# Patient Record
Sex: MALE | Race: WHITE | NOT HISPANIC OR LATINO | Employment: FULL TIME | URBAN - METROPOLITAN AREA
[De-identification: names, ages, dates, MRNs, and addresses within clinical notes are randomized per-mention and may not be internally consistent; named-entity substitution may affect disease eponyms.]

---

## 2017-03-14 ENCOUNTER — TRANSCRIBE ORDERS (OUTPATIENT)
Dept: ADMINISTRATIVE | Facility: HOSPITAL | Age: 62
End: 2017-03-14

## 2017-03-14 DIAGNOSIS — N28.9 UNSPECIFIED DISORDER OF KIDNEY AND URETER: Primary | ICD-10-CM

## 2017-03-23 ENCOUNTER — HOSPITAL ENCOUNTER (OUTPATIENT)
Dept: ULTRASOUND IMAGING | Facility: HOSPITAL | Age: 62
Discharge: HOME | End: 2017-03-23
Admitting: INTERNAL MEDICINE

## 2017-03-23 DIAGNOSIS — N28.9 UNSPECIFIED DISORDER OF KIDNEY AND URETER: ICD-10-CM

## 2017-03-23 PROCEDURE — 76775 US EXAM ABDO BACK WALL LIM: CPT

## 2017-06-15 ENCOUNTER — TRANSCRIBE ORDERS (OUTPATIENT)
Dept: ADMINISTRATIVE | Facility: HOSPITAL | Age: 62
End: 2017-06-15

## 2017-06-15 ENCOUNTER — LAB (OUTPATIENT)
Dept: LAB | Facility: HOSPITAL | Age: 62
End: 2017-06-15

## 2017-06-15 DIAGNOSIS — C73 MALIGNANT NEOPLASM OF THYROID GLAND (HCC): ICD-10-CM

## 2017-06-15 DIAGNOSIS — E78.49 FAMILIAL COMBINED HYPERLIPIDEMIA: ICD-10-CM

## 2017-06-15 DIAGNOSIS — I10 ESSENTIAL HYPERTENSION, MALIGNANT: ICD-10-CM

## 2017-06-15 DIAGNOSIS — E11.311 MACULAR EDEMA, DIABETIC (HCC): ICD-10-CM

## 2017-06-15 DIAGNOSIS — E66.9 OBESITY, UNSPECIFIED OBESITY SEVERITY, UNSPECIFIED OBESITY TYPE: ICD-10-CM

## 2017-06-15 DIAGNOSIS — E11.311 MACULAR EDEMA, DIABETIC (HCC): Primary | ICD-10-CM

## 2017-06-15 LAB
ALBUMIN SERPL-MCNC: 4.8 G/DL (ref 3.5–5.2)
ALBUMIN/GLOB SERPL: 1.5 G/DL
ALP SERPL-CCNC: 54 U/L (ref 39–117)
ALT SERPL W P-5'-P-CCNC: 22 U/L (ref 1–41)
ANION GAP SERPL CALCULATED.3IONS-SCNC: 14.6 MMOL/L
AST SERPL-CCNC: 21 U/L (ref 1–40)
BILIRUB SERPL-MCNC: 0.6 MG/DL (ref 0.1–1.2)
BUN BLD-MCNC: 19 MG/DL (ref 8–23)
BUN/CREAT SERPL: 18.1 (ref 7–25)
CALCIUM SPEC-SCNC: 9.7 MG/DL (ref 8.6–10.5)
CHLORIDE SERPL-SCNC: 96 MMOL/L (ref 98–107)
CHOLEST SERPL-MCNC: 168 MG/DL (ref 0–200)
CO2 SERPL-SCNC: 23.4 MMOL/L (ref 22–29)
CREAT BLD-MCNC: 1.05 MG/DL (ref 0.76–1.27)
GFR SERPL CREATININE-BSD FRML MDRD: 72 ML/MIN/1.73
GLOBULIN UR ELPH-MCNC: 3.2 GM/DL
GLUCOSE BLD-MCNC: 143 MG/DL (ref 65–99)
HBA1C MFR BLD: 6.37 % (ref 4.8–5.6)
HDLC SERPL-MCNC: 60 MG/DL (ref 40–60)
LDLC SERPL CALC-MCNC: 73 MG/DL (ref 0–100)
LDLC/HDLC SERPL: 1.21 {RATIO}
POTASSIUM BLD-SCNC: 4.1 MMOL/L (ref 3.5–5.2)
PROT SERPL-MCNC: 8 G/DL (ref 6–8.5)
SODIUM BLD-SCNC: 134 MMOL/L (ref 136–145)
TRIGL SERPL-MCNC: 176 MG/DL (ref 0–150)
VIT B12 BLD-MCNC: 418 PG/ML (ref 211–946)
VLDLC SERPL-MCNC: 35.2 MG/DL (ref 5–40)

## 2017-06-15 PROCEDURE — 36415 COLL VENOUS BLD VENIPUNCTURE: CPT

## 2017-06-15 PROCEDURE — 82607 VITAMIN B-12: CPT

## 2017-06-15 PROCEDURE — 80061 LIPID PANEL: CPT

## 2017-06-15 PROCEDURE — 80053 COMPREHEN METABOLIC PANEL: CPT

## 2017-06-15 PROCEDURE — 83036 HEMOGLOBIN GLYCOSYLATED A1C: CPT

## 2017-10-03 ENCOUNTER — LAB (OUTPATIENT)
Dept: LAB | Facility: HOSPITAL | Age: 62
End: 2017-10-03

## 2017-10-03 ENCOUNTER — TRANSCRIBE ORDERS (OUTPATIENT)
Dept: ADMINISTRATIVE | Facility: HOSPITAL | Age: 62
End: 2017-10-03

## 2017-10-03 DIAGNOSIS — E78.5 HYPERLIPIDEMIA, UNSPECIFIED HYPERLIPIDEMIA TYPE: ICD-10-CM

## 2017-10-03 DIAGNOSIS — E03.8 HYPOTHYROIDISM DUE TO FIBROUS INVASIVE THYROIDITIS: ICD-10-CM

## 2017-10-03 DIAGNOSIS — Z85.850 PERSONAL HISTORY OF MALIGNANT NEOPLASM OF THYROID: ICD-10-CM

## 2017-10-03 DIAGNOSIS — E11.9 DIABETES MELLITUS WITHOUT COMPLICATION (HCC): Primary | ICD-10-CM

## 2017-10-03 DIAGNOSIS — E06.5 HYPOTHYROIDISM DUE TO FIBROUS INVASIVE THYROIDITIS: ICD-10-CM

## 2017-10-03 DIAGNOSIS — E11.9 DIABETES MELLITUS WITHOUT COMPLICATION (HCC): ICD-10-CM

## 2017-10-03 LAB
ALBUMIN SERPL-MCNC: 4.7 G/DL (ref 3.5–5.2)
ALBUMIN/GLOB SERPL: 1.6 G/DL
ALP SERPL-CCNC: 64 U/L (ref 39–117)
ALT SERPL W P-5'-P-CCNC: 26 U/L (ref 1–41)
ANION GAP SERPL CALCULATED.3IONS-SCNC: 13.7 MMOL/L
AST SERPL-CCNC: 25 U/L (ref 1–40)
BILIRUB SERPL-MCNC: 0.6 MG/DL (ref 0.1–1.2)
BUN BLD-MCNC: 17 MG/DL (ref 8–23)
BUN/CREAT SERPL: 15.5 (ref 7–25)
CALCIUM SPEC-SCNC: 10 MG/DL (ref 8.6–10.5)
CHLORIDE SERPL-SCNC: 100 MMOL/L (ref 98–107)
CHOLEST SERPL-MCNC: 173 MG/DL (ref 0–200)
CO2 SERPL-SCNC: 26.3 MMOL/L (ref 22–29)
CREAT BLD-MCNC: 1.1 MG/DL (ref 0.76–1.27)
GFR SERPL CREATININE-BSD FRML MDRD: 68 ML/MIN/1.73
GLOBULIN UR ELPH-MCNC: 2.9 GM/DL
GLUCOSE BLD-MCNC: 120 MG/DL (ref 65–99)
HBA1C MFR BLD: 6.6 % (ref 4.8–5.6)
HDLC SERPL-MCNC: 66 MG/DL (ref 40–60)
LDLC SERPL CALC-MCNC: 63 MG/DL (ref 0–100)
LDLC/HDLC SERPL: 0.95 {RATIO}
POTASSIUM BLD-SCNC: 4.2 MMOL/L (ref 3.5–5.2)
PROT SERPL-MCNC: 7.6 G/DL (ref 6–8.5)
SODIUM BLD-SCNC: 140 MMOL/L (ref 136–145)
TRIGL SERPL-MCNC: 220 MG/DL (ref 0–150)
TSH SERPL DL<=0.05 MIU/L-ACNC: 1.07 MIU/ML (ref 0.27–4.2)
VLDLC SERPL-MCNC: 44 MG/DL (ref 5–40)

## 2017-10-03 PROCEDURE — 83036 HEMOGLOBIN GLYCOSYLATED A1C: CPT

## 2017-10-03 PROCEDURE — 84432 ASSAY OF THYROGLOBULIN: CPT

## 2017-10-03 PROCEDURE — 80053 COMPREHEN METABOLIC PANEL: CPT

## 2017-10-03 PROCEDURE — 84443 ASSAY THYROID STIM HORMONE: CPT

## 2017-10-03 PROCEDURE — 80061 LIPID PANEL: CPT

## 2017-10-03 PROCEDURE — 36415 COLL VENOUS BLD VENIPUNCTURE: CPT

## 2017-10-03 PROCEDURE — 86800 THYROGLOBULIN ANTIBODY: CPT

## 2017-10-04 LAB — THYROGLOB AB SERPL-ACNC: <1 IU/ML (ref 0–0.9)

## 2017-10-08 LAB — THYROGLOBULIN (TG-RIA): <2 NG/ML

## 2018-10-12 ENCOUNTER — OFFICE VISIT (OUTPATIENT)
Dept: ORTHOPEDIC SURGERY | Facility: CLINIC | Age: 63
End: 2018-10-12

## 2018-10-12 VITALS — WEIGHT: 213.2 LBS | TEMPERATURE: 98.3 F | HEIGHT: 71 IN | BODY MASS INDEX: 29.85 KG/M2

## 2018-10-12 DIAGNOSIS — M48.062 SPINAL STENOSIS OF LUMBAR REGION WITH NEUROGENIC CLAUDICATION: ICD-10-CM

## 2018-10-12 DIAGNOSIS — M54.50 SPINE PAIN, LUMBAR: Primary | ICD-10-CM

## 2018-10-12 PROCEDURE — 72100 X-RAY EXAM L-S SPINE 2/3 VWS: CPT | Performed by: ORTHOPAEDIC SURGERY

## 2018-10-12 PROCEDURE — 99214 OFFICE O/P EST MOD 30 MIN: CPT | Performed by: ORTHOPAEDIC SURGERY

## 2018-10-12 RX ORDER — AMLODIPINE BESYLATE AND ATORVASTATIN CALCIUM 5; 20 MG/1; MG/1
1 TABLET, FILM COATED ORAL DAILY
COMMUNITY
Start: 2015-12-02 | End: 2021-09-21

## 2018-10-12 RX ORDER — NABUMETONE 750 MG/1
TABLET, FILM COATED ORAL
Qty: 60 TABLET | Refills: 0 | Status: SHIPPED | OUTPATIENT
Start: 2018-10-12 | End: 2018-11-21 | Stop reason: SDUPTHER

## 2018-10-12 RX ORDER — LEVOTHYROXINE SODIUM 0.2 MG/1
TABLET ORAL
COMMUNITY
Start: 2015-09-30 | End: 2018-12-11 | Stop reason: ALTCHOICE

## 2018-10-12 RX ORDER — FENOFIBRATE 48 MG/1
48 TABLET, COATED ORAL DAILY
COMMUNITY
Start: 2015-12-02

## 2018-10-12 NOTE — PROGRESS NOTES
New patient or new problem visit    Chief Complaint   Patient presents with   • Lumbar Spine - Establish Care, Pain       HPI: He complains of low back pain chronic but worse in the last 3 months severe stabbing aching radiating sometimes to the legs and thighs predominantly the latter occasional numbness in the left great toe.  He has had no specific treatment.  The severe pain began following a 54 hole golf tournament in which he participated.  Pertinent past history is notable for 2 laminectomies and then a subsequent anterior interbody fusion at L4 5 and L5-S1 many years ago by Dr. Dawit appiah and refugio.  I performed an anterior cervical discectomy and fusion on him in 2015.    PFSH: See chart- reviewed    Review of Systems   Musculoskeletal: Positive for back pain. Negative for arthralgias, joint swelling, myalgias, neck pain and neck stiffness.   Neurological: Positive for weakness (Left arm, Left leg) and numbness (Left leg). Negative for dizziness, tremors, seizures, syncope, facial asymmetry, speech difficulty, light-headedness and headaches.   Hematological: Negative for adenopathy. Does not bruise/bleed easily.   Psychiatric/Behavioral: Negative for agitation, behavioral problems, confusion, dysphoric mood, hallucinations, self-injury, sleep disturbance and suicidal ideas. The patient is not nervous/anxious and is not hyperactive.        PE: Constitutional: Vital signs above-noted.  Awake, alert and oriented    Psychiatric: Affect and insight do not appear grossly disturbed.    Pulmonary: Breathing is unlabored, color is good.    Skin: Warm, dry and normal turgor    Cardiac:  pedal pulses intact.  No edema.    Eyesight and hearing appear adequate for examination purposes      Musculoskeletal:  There is moderate tenderness to percussion and palpation of the spine. Motion appears somewhat.  Posture is unremarkable to coronal and sagittal inspection.    The skin about the area is able for a 4 cm midline  lumbosacral and intercristal laminectomy incision.  There is no palpable or visible deformity.  There is no local spasm.       Neurologic:   Reflexes are 2+ and symmetrical in the patellae and absent in the Achilles.   Motor function is undisturbed in quadriceps, EHL, and gastrocnemius the left but he has slight weakness of the right EHL   Sensation appears symmetrically intact to light touch   .  In the bilateral lower extremities there is no evidence of atrophy.   Clonus is absent..  Gait appears undisturbed.  SLR test negative      MEDICAL DECISION MAKING    XRAY: Plain film x-rays of lumbar spine show large defect out of the right iliac area where the bone graft was harvested, and a solid anterior interbody fusion in good position at L4 4-5 and L5-S1.  Marketed disc degeneration is noted at L 3-L4.  No comparison views are available.    Other: n/a    Impression: Lumbar disc degeneration lumbar spinal stenosis    Plan: I recommended MRI scan for further evaluation am going to start him on Relafen.  I suspect we'll see significant stenosis for which epidural injections will be the starting point but the therapy could be an option as well.

## 2018-10-29 RX ORDER — OXYCODONE AND ACETAMINOPHEN 7.5; 325 MG/1; MG/1
1 TABLET ORAL EVERY 8 HOURS PRN
Qty: 30 TABLET | Refills: 0 | Status: SHIPPED | OUTPATIENT
Start: 2018-10-29 | End: 2018-11-05 | Stop reason: SDUPTHER

## 2018-11-01 ENCOUNTER — TELEPHONE (OUTPATIENT)
Dept: ORTHOPEDIC SURGERY | Facility: CLINIC | Age: 63
End: 2018-11-01

## 2018-11-01 DIAGNOSIS — M54.50 LUMBAR PAIN: ICD-10-CM

## 2018-11-01 DIAGNOSIS — M48.061 SPINAL STENOSIS OF LUMBAR REGION, UNSPECIFIED WHETHER NEUROGENIC CLAUDICATION PRESENT: Primary | ICD-10-CM

## 2018-11-01 DIAGNOSIS — M54.50 LUMBAR SPINE PAIN: Primary | ICD-10-CM

## 2018-11-01 NOTE — TELEPHONE ENCOUNTER
I reviewed the MRI and discussed results with him.  He has stenosis above the old fusion.  I have recommended lumbar epidurals to be performed ASAP and physical therapy.  Please set these up.

## 2018-11-05 ENCOUNTER — HOSPITAL ENCOUNTER (OUTPATIENT)
Dept: PAIN MEDICINE | Facility: HOSPITAL | Age: 63
Discharge: HOME | End: 2018-11-05
Attending: ORTHOPAEDIC SURGERY | Admitting: ANESTHESIOLOGY

## 2018-11-05 ENCOUNTER — ANESTHESIA (OUTPATIENT)
Dept: PAIN MEDICINE | Facility: HOSPITAL | Age: 63
End: 2018-11-05

## 2018-11-05 ENCOUNTER — HOSPITAL ENCOUNTER (OUTPATIENT)
Dept: GENERAL RADIOLOGY | Facility: HOSPITAL | Age: 63
Discharge: HOME | End: 2018-11-05

## 2018-11-05 ENCOUNTER — ANESTHESIA EVENT (OUTPATIENT)
Dept: PAIN MEDICINE | Facility: HOSPITAL | Age: 63
End: 2018-11-05

## 2018-11-05 VITALS
TEMPERATURE: 98 F | SYSTOLIC BLOOD PRESSURE: 154 MMHG | OXYGEN SATURATION: 93 % | WEIGHT: 211 LBS | RESPIRATION RATE: 16 BRPM | HEIGHT: 71 IN | DIASTOLIC BLOOD PRESSURE: 102 MMHG | BODY MASS INDEX: 29.54 KG/M2 | HEART RATE: 59 BPM

## 2018-11-05 DIAGNOSIS — R52 PAIN: ICD-10-CM

## 2018-11-05 DIAGNOSIS — M48.061 SPINAL STENOSIS OF LUMBAR REGION WITHOUT NEUROGENIC CLAUDICATION: Primary | ICD-10-CM

## 2018-11-05 LAB — GLUCOSE BLDC GLUCOMTR-MCNC: 134 MG/DL (ref 70–130)

## 2018-11-05 PROCEDURE — 82962 GLUCOSE BLOOD TEST: CPT

## 2018-11-05 PROCEDURE — 77003 FLUOROGUIDE FOR SPINE INJECT: CPT

## 2018-11-05 PROCEDURE — 0 IOPAMIDOL 41 % SOLUTION: Performed by: ANESTHESIOLOGY

## 2018-11-05 PROCEDURE — 25010000002 METHYLPREDNISOLONE PER 80 MG: Performed by: ANESTHESIOLOGY

## 2018-11-05 PROCEDURE — C1755 CATHETER, INTRASPINAL: HCPCS

## 2018-11-05 RX ORDER — METHYLPREDNISOLONE ACETATE 80 MG/ML
80 INJECTION, SUSPENSION INTRA-ARTICULAR; INTRALESIONAL; INTRAMUSCULAR; SOFT TISSUE ONCE
Status: COMPLETED | OUTPATIENT
Start: 2018-11-05 | End: 2018-11-05

## 2018-11-05 RX ORDER — OXYCODONE AND ACETAMINOPHEN 7.5; 325 MG/1; MG/1
1 TABLET ORAL EVERY 8 HOURS PRN
Qty: 30 TABLET | Refills: 0 | Status: SHIPPED | OUTPATIENT
Start: 2018-11-05 | End: 2019-08-28

## 2018-11-05 RX ORDER — LIDOCAINE HYDROCHLORIDE 10 MG/ML
1 INJECTION, SOLUTION INFILTRATION; PERINEURAL ONCE
Status: DISCONTINUED | OUTPATIENT
Start: 2018-11-05 | End: 2018-11-06 | Stop reason: HOSPADM

## 2018-11-05 RX ORDER — MIDAZOLAM HYDROCHLORIDE 1 MG/ML
1 INJECTION INTRAMUSCULAR; INTRAVENOUS
Status: DISCONTINUED | OUTPATIENT
Start: 2018-11-05 | End: 2018-11-06 | Stop reason: HOSPADM

## 2018-11-05 RX ORDER — FENTANYL CITRATE 50 UG/ML
50 INJECTION, SOLUTION INTRAMUSCULAR; INTRAVENOUS AS NEEDED
Status: DISCONTINUED | OUTPATIENT
Start: 2018-11-05 | End: 2018-11-06 | Stop reason: HOSPADM

## 2018-11-05 RX ADMIN — METHYLPREDNISOLONE ACETATE 80 MG: 80 INJECTION, SUSPENSION INTRA-ARTICULAR; INTRALESIONAL; INTRAMUSCULAR; SOFT TISSUE at 14:20

## 2018-11-05 RX ADMIN — IOPAMIDOL 10 ML: 408 INJECTION, SOLUTION INTRATHECAL at 14:20

## 2018-11-05 NOTE — TELEPHONE ENCOUNTER
PT CALLED SAID HE IS GOING OUT OF TOWN AND CONCERNED HE WILL RUN OUT OF HIS MEDICINE AND WANTS TO TALK WITH YOU ABOUT IT/DM

## 2018-11-05 NOTE — H&P
"[facility]    History and Physical    Patient Name: EVARISTO Garcia  :  1955  MRN:  [id number]  Date of Admission: 2018    Subjective     Patient is a 63 y.o. male presents with chief complaint of acute, intermitent, moderate low back, hips: bilateral and leg: bilateral pain.  Onset of symptoms was gradual starting several years ago.  Symptoms are associated/aggravated by activity or twisting. Symptoms improve with rest    The following portions of the patients history were reviewed and updated as appropriate: current medications, allergies, past medical history, past surgical history, past family history, past social history and problem list                Objective     Past Medical History: No past medical history on file.  Past Surgical History:   Past Surgical History:   Procedure Laterality Date   • BACK SURGERY      Double anterior L3, L4, L5   • BACK SURGERY     • LAMINECTOMY  ,      L4-L5, L3-L4   • THYROIDECTOMY  2015     Family History: No family history on file.  Social History:   Social History   Substance Use Topics   • Smoking status: Not on file   • Smokeless tobacco: Not on file   • Alcohol use Not on file       Vital Signs Range for the last 24 hours  Temperature:     Temp Source:     BP:     Pulse:     Respirations:     SPO2:     O2 Amount (l/min):     O2 Devices     Weight: Weight:  [95.7 kg (211 lb)] 95.7 kg (211 lb)     Flowsheet Rows      First Filed Value   Admission Height  180.3 cm (71\") Documented at 2018 1351   Admission Weight  95.7 kg (211 lb) Documented at 2018 1351          --------------------------------------------------------------------------------    Current Outpatient Prescriptions   Medication Sig Dispense Refill   • amLODIPine-atorvastatin (CADUET) 5-20 MG per tablet Take  by mouth.     • fenofibrate (TRICOR) 48 MG tablet Take  by mouth.     • levothyroxine (SYNTHROID, LEVOTHROID) 200 MCG tablet Take  by mouth.     • metFORMIN " (GLUCOPHAGE) 1000 MG tablet Take 1,000 mg by mouth Daily.     • nabumetone (RELAFEN) 750 MG tablet Take 2 tablets once daily 60 tablet 0   • oxyCODONE-acetaminophen (PERCOCET) 7.5-325 MG per tablet Take 1 tablet by mouth Every 8 (Eight) Hours As Needed for Moderate Pain . 30 tablet 0     No current facility-administered medications for this encounter.        --------------------------------------------------------------------------------  Assessment/Plan      Anesthesia Evaluation     Patient summary reviewed and Nursing notes reviewed   NPO Solid Status: > 8 hours  NPO Liquid Status: > 2 hours    Pain impairs ability to perform ADLs: Sleeping  Modalities previously tried to control pain with limited effectiveness within the last 4-6 weeks: Rest     Airway   Mallampati: II  TM distance: >3 FB  Neck ROM: full  Dental - normal exam     Pulmonary - negative pulmonary ROS and normal exam   Cardiovascular - negative cardio ROS and normal exam        Neuro/Psych- negative ROS and neuro exam normal  GI/Hepatic/Renal/Endo    (+)   diabetes mellitus type 2,     Musculoskeletal (-) normal exam    (+) back pain, radiculopathy  Abdominal  - normal exam   Substance History - negative use     OB/GYN negative ob/gyn ROS         Other   (+) arthritis                Diagnosis and Plan    Treatment Plan  ASA 3      Procedures: Lumbar Epidural Steroid Injection(LESI), With fluoroscopy,       Anesthetic plan and risks discussed with patient.          Diagnosis     * Lumbar radiculopathy [M54.16]     * Lumbar spinal stenosis [M48.061]     * Lumbar degenerative disc disease [M51.36]     * Displacement of lumbar disc with radiculopathy [M51.16]

## 2018-11-05 NOTE — H&P (VIEW-ONLY)
"[facility]    History and Physical    Patient Name: EVARISTO Garcia  :  1955  MRN:  [id number]  Date of Admission: 2018    Subjective     Patient is a 63 y.o. male presents with chief complaint of acute, intermitent, moderate low back, hips: bilateral and leg: bilateral pain.  Onset of symptoms was gradual starting several years ago.  Symptoms are associated/aggravated by activity or twisting. Symptoms improve with rest    The following portions of the patients history were reviewed and updated as appropriate: current medications, allergies, past medical history, past surgical history, past family history, past social history and problem list                Objective     Past Medical History: No past medical history on file.  Past Surgical History:   Past Surgical History:   Procedure Laterality Date   • BACK SURGERY      Double anterior L3, L4, L5   • BACK SURGERY     • LAMINECTOMY  ,      L4-L5, L3-L4   • THYROIDECTOMY  2015     Family History: No family history on file.  Social History:   Social History   Substance Use Topics   • Smoking status: Not on file   • Smokeless tobacco: Not on file   • Alcohol use Not on file       Vital Signs Range for the last 24 hours  Temperature:     Temp Source:     BP:     Pulse:     Respirations:     SPO2:     O2 Amount (l/min):     O2 Devices     Weight: Weight:  [95.7 kg (211 lb)] 95.7 kg (211 lb)     Flowsheet Rows      First Filed Value   Admission Height  180.3 cm (71\") Documented at 2018 1351   Admission Weight  95.7 kg (211 lb) Documented at 2018 1351          --------------------------------------------------------------------------------    Current Outpatient Prescriptions   Medication Sig Dispense Refill   • amLODIPine-atorvastatin (CADUET) 5-20 MG per tablet Take  by mouth.     • fenofibrate (TRICOR) 48 MG tablet Take  by mouth.     • levothyroxine (SYNTHROID, LEVOTHROID) 200 MCG tablet Take  by mouth.     • metFORMIN " (GLUCOPHAGE) 1000 MG tablet Take 1,000 mg by mouth Daily.     • nabumetone (RELAFEN) 750 MG tablet Take 2 tablets once daily 60 tablet 0   • oxyCODONE-acetaminophen (PERCOCET) 7.5-325 MG per tablet Take 1 tablet by mouth Every 8 (Eight) Hours As Needed for Moderate Pain . 30 tablet 0     No current facility-administered medications for this encounter.        --------------------------------------------------------------------------------  Assessment/Plan      Anesthesia Evaluation     Patient summary reviewed and Nursing notes reviewed   NPO Solid Status: > 8 hours  NPO Liquid Status: > 2 hours    Pain impairs ability to perform ADLs: Sleeping  Modalities previously tried to control pain with limited effectiveness within the last 4-6 weeks: Rest     Airway   Mallampati: II  TM distance: >3 FB  Neck ROM: full  Dental - normal exam     Pulmonary - negative pulmonary ROS and normal exam   Cardiovascular - negative cardio ROS and normal exam        Neuro/Psych- negative ROS and neuro exam normal  GI/Hepatic/Renal/Endo    (+)   diabetes mellitus type 2,     Musculoskeletal (-) normal exam    (+) back pain, radiculopathy  Abdominal  - normal exam   Substance History - negative use     OB/GYN negative ob/gyn ROS         Other   (+) arthritis                Diagnosis and Plan    Treatment Plan  ASA 3      Procedures: Lumbar Epidural Steroid Injection(LESI), With fluoroscopy,       Anesthetic plan and risks discussed with patient.          Diagnosis     * Lumbar radiculopathy [M54.16]     * Lumbar spinal stenosis [M48.061]     * Lumbar degenerative disc disease [M51.36]     * Displacement of lumbar disc with radiculopathy [M51.16]

## 2018-11-05 NOTE — ANESTHESIA PROCEDURE NOTES
PAIN Epidural block      Patient reassessed immediately prior to procedure    Patient location during procedure: pain clinic  Indication:procedure for pain  Performed By  Anesthesiologist: CAROLINE EASLEY  Preanesthetic Checklist  Completed: patient identified, site marked, surgical consent, pre-op evaluation, timeout performed, risks and benefits discussed and monitors and equipment checked  Additional Notes  Depomedrol - 80mg    Needle position confirmed by fluoroscopy and epidurogram using 2cc of [id number].    Diagnosis  Post-Op Diagnosis Codes:     * Lumbar radiculopathy (M54.16)     * Lumbar spinal stenosis (M48.061)     * Lumbar degenerative disc disease (M51.36)     * Displacement of lumbar disc with radiculopathy (M51.16)    Prep:  Pt Position:prone  Sterile Tech:cap, gloves, mask and sterile barrier  Prep:chlorhexidine gluconate and isopropyl alcohol  Monitoring:blood pressure monitoring, continuous pulse oximetry and EKG  Procedure:  Sedation: no   Approach:right paramedian  Guidance: fluoroscopy  Location:lumbar  Level:3-4  Needle Type:Tuohy  Needle Gauge:20  Aspiration:negative  Medications:  Depomedrol:80 mg  Preservative Free Saline:2mL  Isovue:2mL    Post Assessment:  Post-procedure: bandaidmichael.  Pt Tolerance:patient tolerated the procedure well with no apparent complications  Complications:no

## 2018-11-21 ENCOUNTER — TELEPHONE (OUTPATIENT)
Dept: PAIN MEDICINE | Facility: HOSPITAL | Age: 63
End: 2018-11-21

## 2018-11-21 RX ORDER — NABUMETONE 750 MG/1
TABLET, FILM COATED ORAL
Qty: 60 TABLET | Refills: 0 | Status: SHIPPED | OUTPATIENT
Start: 2018-11-21 | End: 2018-12-29 | Stop reason: SDUPTHER

## 2018-11-27 ENCOUNTER — HOSPITAL ENCOUNTER (OUTPATIENT)
Dept: PAIN MEDICINE | Facility: HOSPITAL | Age: 63
Discharge: HOME | End: 2018-11-27
Attending: ANESTHESIOLOGY | Admitting: ANESTHESIOLOGY

## 2018-11-27 ENCOUNTER — HOSPITAL ENCOUNTER (OUTPATIENT)
Dept: GENERAL RADIOLOGY | Facility: HOSPITAL | Age: 63
Discharge: HOME | End: 2018-11-27

## 2018-11-27 ENCOUNTER — ANESTHESIA (OUTPATIENT)
Dept: PAIN MEDICINE | Facility: HOSPITAL | Age: 63
End: 2018-11-27

## 2018-11-27 ENCOUNTER — ANESTHESIA EVENT (OUTPATIENT)
Dept: PAIN MEDICINE | Facility: HOSPITAL | Age: 63
End: 2018-11-27

## 2018-11-27 VITALS
HEART RATE: 76 BPM | TEMPERATURE: 97.7 F | RESPIRATION RATE: 16 BRPM | DIASTOLIC BLOOD PRESSURE: 93 MMHG | OXYGEN SATURATION: 97 % | SYSTOLIC BLOOD PRESSURE: 151 MMHG

## 2018-11-27 DIAGNOSIS — R52 PAIN: ICD-10-CM

## 2018-11-27 DIAGNOSIS — M48.061 SPINAL STENOSIS OF LUMBAR REGION WITHOUT NEUROGENIC CLAUDICATION: ICD-10-CM

## 2018-11-27 PROCEDURE — C1755 CATHETER, INTRASPINAL: HCPCS

## 2018-11-27 PROCEDURE — 77003 FLUOROGUIDE FOR SPINE INJECT: CPT

## 2018-11-27 PROCEDURE — 25010000002 METHYLPREDNISOLONE PER 80 MG: Performed by: ANESTHESIOLOGY

## 2018-11-27 RX ORDER — METHYLPREDNISOLONE ACETATE 80 MG/ML
80 INJECTION, SUSPENSION INTRA-ARTICULAR; INTRALESIONAL; INTRAMUSCULAR; SOFT TISSUE ONCE
Status: DISCONTINUED | OUTPATIENT
Start: 2018-11-27 | End: 2018-11-28 | Stop reason: HOSPADM

## 2018-11-27 RX ORDER — SODIUM CHLORIDE 0.9 % (FLUSH) 0.9 %
1-10 SYRINGE (ML) INJECTION AS NEEDED
Status: DISCONTINUED | OUTPATIENT
Start: 2018-11-27 | End: 2018-11-28 | Stop reason: HOSPADM

## 2018-11-27 RX ORDER — FENTANYL CITRATE 50 UG/ML
50 INJECTION, SOLUTION INTRAMUSCULAR; INTRAVENOUS AS NEEDED
Status: DISCONTINUED | OUTPATIENT
Start: 2018-11-27 | End: 2018-11-28 | Stop reason: HOSPADM

## 2018-11-27 RX ORDER — LIDOCAINE HYDROCHLORIDE 10 MG/ML
1 INJECTION, SOLUTION INFILTRATION; PERINEURAL ONCE AS NEEDED
Status: DISCONTINUED | OUTPATIENT
Start: 2018-11-27 | End: 2018-11-28 | Stop reason: HOSPADM

## 2018-11-27 RX ORDER — METHYLPREDNISOLONE ACETATE 80 MG/ML
80 INJECTION, SUSPENSION INTRA-ARTICULAR; INTRALESIONAL; INTRAMUSCULAR; SOFT TISSUE ONCE
Status: COMPLETED | OUTPATIENT
Start: 2018-11-27 | End: 2018-11-27

## 2018-11-27 RX ORDER — MIDAZOLAM HYDROCHLORIDE 1 MG/ML
1 INJECTION INTRAMUSCULAR; INTRAVENOUS AS NEEDED
Status: DISCONTINUED | OUTPATIENT
Start: 2018-11-27 | End: 2018-11-28 | Stop reason: HOSPADM

## 2018-11-27 RX ADMIN — METHYLPREDNISOLONE ACETATE 80 MG: 80 INJECTION, SUSPENSION INTRA-ARTICULAR; INTRALESIONAL; INTRAMUSCULAR; SOFT TISSUE at 13:03

## 2018-11-27 NOTE — ANESTHESIA PROCEDURE NOTES
PAIN Epidural block    Pre-sedation assessment completed: 11/27/2018 12:51 PM    Patient reassessed immediately prior to procedure    Patient location during procedure: pain clinic  Indication:procedure for pain  Performed By  Anesthesiologist: Celeste Braun MD  Preanesthetic Checklist  Completed: patient identified, site marked, surgical consent, pre-op evaluation, timeout performed, IV checked, risks and benefits discussed and monitors and equipment checked  Additional Notes  Lumbar: degen, radiculopathy, displacement, stenosis  Fluoro used.      Pt did not tolerate local skin infiltration well or attempt at placement at L3/4.  Was writhing in pain & using explitive language.  Offered to abort procedure.  D/t pain w/ procedure, relocated & topicalized L5/S1. Again, poor tolerance to skin infiltration.  Easily gained entry into epidural space there on first attempt.  Would consider sedation in the future.      Prep:  Pt Position:prone  Sterile Tech:cap, gloves, mask and sterile barrier  Prep:chlorhexidine gluconate and isopropyl alcohol  Monitoring:blood pressure monitoring, continuous pulse oximetry and EKG  Procedure:  Sedation: no   Approach:right paramedian  Guidance: fluoroscopy  Location:lumbar  Level:5-6  Needle Type:Tuohy  Needle Gauge:20  Aspiration:negative  Medications:  Depomedrol:80  Preservative Free Saline:3mL    Post Assessment:  Dressing:occlusive dressing applied  Pt Tolerance:patient tolerated the procedure well with no apparent complications  Complications:no

## 2018-11-27 NOTE — INTERVAL H&P NOTE
[facility]  H&P reviewed. No changes since last visit.  Patient states   50-75% improvement since the last procedure/injection.  Pain relief lasted 7-10 days.  Pain today 8/10.  Presents for lesi 2.      Lumbar: degeneration, stenosis, radiculopathy, displacement    Airway assessed since last visit. Airway class equals: 2.    
Euthymic

## 2018-11-28 ENCOUNTER — APPOINTMENT (OUTPATIENT)
Dept: PAIN MEDICINE | Facility: HOSPITAL | Age: 63
End: 2018-11-28

## 2018-12-10 NOTE — PROGRESS NOTES
Subjective   Patient ID: EVARISTO Garcia is a 63 y.o. male is here today for back pain. He also has weakness. He has had 3 JOSE.    History of Present Illness    This patient has been having severe pain in his back with radiation into both of his legs.  This problem originally began in September of this year.  He has had 3 previous surgeries on his lower back but the last one was in 1988 and was an anterior fusion at L4 5 and L5-S1.  He has some residual nerve damage in the left leg the cause of those surgeries but has done relatively well since 1988 until this started.  Initially when this pain started it was severe and unrelenting.  The pain was sharp and stabbing.  It radiated into both legs but was somewhat worse on the right than the left.  He has had 2 epidural blocks which helped although the first one helped completely but then the pain came back.  The second one did not help as much.  He has no difficulty with bowel or bladder control or other associated symptoms.  The pain is worse with activity and nothing really has made it better other than the blocks.    The following portions of the patient's history were reviewed and updated as appropriate: allergies, current medications, past family history, past medical history, past social history, past surgical history and problem list.    Review of Systems   Respiratory: Negative for chest tightness and shortness of breath.    Cardiovascular: Negative for chest pain.   Musculoskeletal: Positive for back pain and gait problem.   Neurological: Positive for weakness.   All other systems reviewed and are negative.      Objective   Physical Exam   Constitutional: He is oriented to person, place, and time. He appears well-developed and well-nourished.   HENT:   Head: Normocephalic and atraumatic.   Eyes: Conjunctivae and EOM are normal. Pupils are equal, round, and reactive to light.   Fundoscopic exam:       The right eye shows no papilledema. The right eye shows  venous pulsations.        The left eye shows no papilledema. The left eye shows venous pulsations.   Neck: Carotid bruit is not present.   Neurological: He is oriented to person, place, and time. He has a normal Finger-Nose-Finger Test and a normal Heel to Shin Test. Gait normal.   Reflex Scores:       Tricep reflexes are 2+ on the right side and 2+ on the left side.       Bicep reflexes are 2+ on the right side and 2+ on the left side.       Brachioradialis reflexes are 2+ on the right side and 2+ on the left side.       Patellar reflexes are 2+ on the right side and 2+ on the left side.       Achilles reflexes are 2+ on the right side and 2+ on the left side.  Psychiatric: His speech is normal.     Neurologic Exam     Mental Status   Oriented to person, place, and time.   Registration of memory: Good recent and remote memory.   Attention: normal. Concentration: normal.   Speech: speech is normal   Level of consciousness: alert  Knowledge: consistent with education.     Cranial Nerves     CN II   Visual fields full to confrontation.   Visual acuity: normal    CN III, IV, VI   Pupils are equal, round, and reactive to light.  Extraocular motions are normal.     CN V   Facial sensation intact.   Right corneal reflex: normal  Left corneal reflex: normal    CN VII   Facial expression full, symmetric.   Right facial weakness: none  Left facial weakness: none    CN VIII   Hearing: intact    CN IX, X   Palate: symmetric    CN XI   Right sternocleidomastoid strength: normal  Left sternocleidomastoid strength: normal    CN XII   Tongue: not atrophic  Tongue deviation: none    Motor Exam   Muscle bulk: normal  Right arm tone: normal  Left arm tone: normal  Right leg tone: normal  Left leg tone: normal    Strength   Strength 5/5 except as noted.     Sensory Exam   Light touch normal.     Gait, Coordination, and Reflexes     Gait  Gait: normal    Coordination   Finger to nose coordination: normal  Heel to shin coordination:  normal    Reflexes   Right brachioradialis: 2+  Left brachioradialis: 2+  Right biceps: 2+  Left biceps: 2+  Right triceps: 2+  Left triceps: 2+  Right patellar: 2+  Left patellar: 2+  Right achilles: 2+  Left achilles: 2+  Right : 2+  Left : 2+      Assessment/Plan   Independent Review of Radiographic Studies:      Reviewed a set of plain films done at [facility] which show flexion and extension films with no evidence of abnormal movement.  The quality of the films is extremely poor.  I also reviewed an MRI of the lumbar spine done on 23 October.  This shows what appears to be a solid anterior fusion at L4 5 and L5-S1 with a widely patent canal and neural foramina.  There is fairly severe stenosis at L3 4 but L2-3 is fairly open as is L1 2.    Medical Decision Making:      I told the patient and his wife about the imaging.  I told them that from my point of view I would suggest living with this as long as he possibly can.  If he simply cannot then we can certainly consider a decompression and fusion at L3 4 although I think he would require a myelogram before any surgery could be done.  I told him that he should go ahead and have his third block which is artery scheduled for later this month and go ahead and start some physical therapy.  I will see him back a couple of weeks after the block and see how he is doing.  If he is not very much better than we will get him scheduled for a lumbar myelogram.    C was seen today for back pain.    Diagnoses and all orders for this visit:    Spinal stenosis of lumbar region with neurogenic claudication  -     Ambulatory Referral to Physical Therapy      Return in about 4 weeks (around 1/8/2019).

## 2018-12-11 ENCOUNTER — OFFICE VISIT (OUTPATIENT)
Dept: NEUROSURGERY | Facility: CLINIC | Age: 63
End: 2018-12-11

## 2018-12-11 ENCOUNTER — HOSPITAL ENCOUNTER (OUTPATIENT)
Dept: PHYSICAL THERAPY | Facility: HOSPITAL | Age: 63
Setting detail: THERAPIES SERIES
Discharge: HOME | End: 2018-12-11
Attending: ORTHOPAEDIC SURGERY

## 2018-12-11 VITALS — SYSTOLIC BLOOD PRESSURE: 123 MMHG | DIASTOLIC BLOOD PRESSURE: 78 MMHG | HEART RATE: 76 BPM

## 2018-12-11 DIAGNOSIS — M48.062 SPINAL STENOSIS OF LUMBAR REGION WITH NEUROGENIC CLAUDICATION: Primary | ICD-10-CM

## 2018-12-11 DIAGNOSIS — M54.10 RADICULAR PAIN OF BOTH LOWER EXTREMITIES: ICD-10-CM

## 2018-12-11 DIAGNOSIS — M48.061 SPINAL STENOSIS OF LUMBAR REGION, UNSPECIFIED WHETHER NEUROGENIC CLAUDICATION PRESENT: Primary | ICD-10-CM

## 2018-12-11 DIAGNOSIS — R68.89 DECREASED ACTIVITY TOLERANCE: ICD-10-CM

## 2018-12-11 DIAGNOSIS — R29.898 WEAKNESS OF BOTH LOWER EXTREMITIES: ICD-10-CM

## 2018-12-11 PROCEDURE — 99203 OFFICE O/P NEW LOW 30 MIN: CPT | Performed by: NEUROLOGICAL SURGERY

## 2018-12-11 PROCEDURE — 97140 MANUAL THERAPY 1/> REGIONS: CPT | Performed by: PHYSICAL THERAPIST

## 2018-12-11 PROCEDURE — 97162 PT EVAL MOD COMPLEX 30 MIN: CPT | Performed by: PHYSICAL THERAPIST

## 2018-12-11 PROCEDURE — G8979 MOBILITY GOAL STATUS: HCPCS | Performed by: PHYSICAL THERAPIST

## 2018-12-11 PROCEDURE — G8978 MOBILITY CURRENT STATUS: HCPCS | Performed by: PHYSICAL THERAPIST

## 2018-12-11 PROCEDURE — 97110 THERAPEUTIC EXERCISES: CPT | Performed by: PHYSICAL THERAPIST

## 2018-12-11 RX ORDER — LEVOTHYROXINE SODIUM 300 UG/1
TABLET ORAL
COMMUNITY
End: 2019-08-28

## 2018-12-11 NOTE — THERAPY EVALUATION
Outpatient Physical Therapy Ortho Initial Evaluation  [facility]     Patient Name: EVARISTO Garcia  : 1955  MRN: [id number]  Today's Date: 2018      Visit Date: 2018    Patient Active Problem List   Diagnosis   • Spinal stenosis of lumbar region with neurogenic claudication        Past Medical History:   Diagnosis Date   • Cancer (CMS/[facility])    • Diabetes mellitus (CMS/[facility])    • Disease of thyroid gland    • Hyperlipidemia    • Low back pain    • Sleep apnea         Past Surgical History:   Procedure Laterality Date   • BACK SURGERY      Double anterior L3, L4, L5   • BACK SURGERY     • EPIDURAL BLOCK     • LAMINECTOMY  1978     L4-L5, L3-L4   • SPINE SURGERY     • THYROIDECTOMY  2015       Visit Dx:     ICD-10-CM ICD-9-CM   1. Spinal stenosis of lumbar region, unspecified whether neurogenic claudication present M48.061 724.02   2. Weakness of both lower extremities R29.898 729.89   3. Decreased activity tolerance R68.89 780.99   4. Radicular pain of both lower extremities M54.10 724.4       Patient History     Row Name 18 1200             History    Chief Complaint  Pain  -LC      Type of Pain  Back pain  -      Date Current Problem(s) Began  -- chronic  -LC      Brief Description of Current Complaint  Pt presents to PT with diagnosis of spinal stenosis of lumbar region. He has hx of multiple back surgeries the most recent being anterior fusion S1, L5, L4 in . He has had multiple epidurals and will receive his most recent 3rd epidural in the next few weeks. Pt has referred pain and weakness symptoms into BLEs radiating from lumbar spine. All activites are uncomfortable specifically in standing and transitional movements. He reports that legs become very tired as the day goes on. Wants to try some conservative therapy intervention prior to epidural to see if he can improve his functional mobility.  -LC      Previous treatment for THIS PROBLEM   Injections;Surgery;Medication  -LC      Surgery Date:  12/11/88  -LC      Patient/Caregiver Goals  Relieve pain;Return to prior level of function;Improve mobility;Improve strength;Know what to do to help the symptoms  -LC      Patient's Rating of General Health  Fair  -LC      Occupation/sports/leisure activities  cycle bar,  -      What clinical tests have you had for this problem?  MRI;X-ray  -      Results of Clinical Tests  foraminal stenosis  -LC         Pain     Pain Location  Back;Buttocks;Leg  -LC      Pain at Present  6  -LC      Pain at Best  5  -LC      Pain at Worst  10  -LC      Pain Frequency  Constant/continuous  -LC      Pain Description  Aching;Sore;Shooting;Sharp;Radiating  -LC      What Performance Factors Make the Current Problem(s) WORSE?  all activity, transitions, prolonged standing, walking, ascending/descending stairs  -      Is your sleep disturbed?  Yes  -LC      Is medication used to assist with sleep?  Yes  -LC      Difficulties with ADL's?  ascending/descending stairs, prolonged standing, walking, transitional movements.  -         Fall Risk Assessment    Any falls in the past year:  No  -LC         Daily Activities    Primary Language  English  -      Pt Participated in POC and Goals  Yes  -LC         Safety    Are you being hurt, hit, or frightened by anyone at home or in your life?  No  -LC      Are you being neglected by a caregiver  No  -        User Key  (r) = Recorded By, (t) = Taken By, (c) = Cosigned By    Initials Name Provider Type    LC Jaswindre Ibarra, PT DPT Physical Therapist          PT Ortho     Row Name 12/11/18 1000       Posture/Observations    Alignment Options  Thoracic kyphosis;Rounded shoulders;Forward head  -LC    Forward Head  Mild  -LC    Thoracic Kyphosis  Mild  -LC    Rounded Shoulders  Mild  -LC    Posture/Observations Comments  forward flexed trunk, shuffling antalgic gait with reciprocal step to gait pattern.  -LC       Lumbar/SI Special Tests     Standing Flexion Test (SI Dysfunction)  Bilateral:;Positive  -LC    Stork Test (SI Dysfunction)  Bilateral:;Positive  -LC    Seated Flexion Test (SI Dysfunction)  Bilateral:;Positive  -LC    SI Compression Test (SI Dysfunction)  Bilateral:;Positive  -LC    ARACELY (hip vs. SI Dysfunction)  Bilateral:;Positive  -LC    FAIR Test (Piriformis Syndrome)  Bilateral:;Positive  -LC    Lumbar/SI Special Tests Comments  signs and symptoms of foraminal stenosis  -LC       MMT (Manual Muscle Testing)    Rt Lower Ext  Rt Hip Flexion;Rt Hip Extension;Rt Hip ABduction;Rt Hip ADduction;Rt Knee Extension;Rt Knee Flexion  -LC    Lt Lower Ext  Lt Hip Flexion;Lt Hip Extension;Lt Hip ABduction;Lt Hip ADduction;Lt Knee Extension;Lt Knee Flexion  -LC       MMT Right Lower Ext    Rt Hip Flexion MMT, Gross Movement  (3+/5) fair plus  -LC    Rt Hip Extension MMT, Gross Movement  (3+/5) fair plus  -LC    Rt Hip ABduction MMT, Gross Movement  (3+/5) fair plus  -LC    Rt Hip ADduction MMT, Gross Movement  (3+/5) fair plus  -LC    Rt Knee Extension MMT, Gross Movement  (3+/5) fair plus  -LC    Rt Knee Flexion MMT, Gross Movement  (3+/5) fair plus  -LC    Rt Lower Extremity Comments   mild pain  -LC       MMT Left Lower Ext    Lt Hip Flexion MMT, Gross Movement  (3+/5) fair plus  -LC    Lt Hip Extension MMT, Gross Movement  (3+/5) fair plus  -LC    Lt Hip ABduction MMT, Gross Movement  (3+/5) fair plus  -LC    Lt Hip ADduction MMT, Gross Movement  (3+/5) fair plus  -LC    Lt Knee Extension MMT, Gross Movement  (3+/5) fair plus  -LC    Lt Knee Flexion MMT, Gross Movement  (3+/5) fair plus  -LC       Lower Extremity Flexibility    Hamstrings  Bilateral:;Mildly limited  -LC    Quadriceps  Bilateral:;Mildly limited  -LC    ITB  Bilateral:;Mildly limited  -LC    Hip Adductors  Bilateral:;Mildly limited  -LC    Hip External Rotators  Bilateral:;Mildly limited  -LC    Hip Internal Rotators  Bilateral:;Mildly limited  -LC      User Key  (r) = Recorded  By, (t) = Taken By, (c) = Cosigned By    Initials Name Provider Type    Jaswinder Kathleen, PT DPT Physical Therapist                      Therapy Education  Given: HEP, Symptoms/condition management, Pain management  Program: New  How Provided: Verbal, Demonstration, Written  Provided to: Patient  Level of Understanding: Teach back education performed, Verbalized, Demonstrated     PT OP Goals     Row Name 12/11/18 1300          PT Short Term Goals    STG 1  Pt will be independent with HEP to improve lumbar mobility and stability and reduce referred pain symptoms.  -     STG 1 Progress  New  -     STG 2  Pt will have modified oswestry <40%.  -     STG 2 Progress  New  -        Time Calculation    PT Goal Re-Cert Due Date  01/11/19  -       User Key  (r) = Recorded By, (t) = Taken By, (c) = Cosigned By    Initials Name Provider Type    Jaswinder Kathleen, PT DPT Physical Therapist          PT Assessment/Plan     Row Name 12/11/18 1421          PT Assessment    Functional Limitations  Limitations in community activities;Performance in self-care ADL;Limitations in functional capacity and performance;Impaired gait;Performance in leisure activities  -     Impairments  Gait;Impaired flexibility;Muscle strength;Pain;Joint mobility;Posture;Sensation  -     Assessment Comments  Pt is 63 y.o. male who presents to PT with chronic lumbar pain, imaging dx spinal stenosis of lumbar region, and multiple past back surgeries. He ambulates with antalgic gait pattern with decreased step length and forward flexed posture. He has pain radiating into bilateral lower extremities. Pt reports pain 6/10 at most times during the day. He has BLE MMT grossly 3+/5 with pain during testing. He reports feeling of heaviness in legs with increased activity and pain with all standing or transitional movement. Pt has positive signs, symptoms, and special tests to indicate foraminal stenosis, SI joint pathology, and decreased core  strength. He was educated on simple HEP to improve thoracic mobility and promote neutral lumbar spine. Pt responded well to intervention and reported decreased pain with no referred symptoms. Pt issued written copy of HEP.  -LC     Please refer to paper survey for additional self-reported information  Yes  -LC     Rehab Potential  Fair  -LC     Patient/caregiver participated in establishment of treatment plan and goals  Yes  -LC     Patient would benefit from skilled therapy intervention  Yes  -LC        PT Plan    PT Frequency  Other (comment) pt will contact PT to schedule further.  -     Planned CPT's?  PT EVAL MOD COMPLELITY: 19569;PT THER ACT EA 15 MIN: 42705;PT THER PROC EA 15 MIN: 46274;PT RE-EVAL: 76945;PT MANUAL THERAPY EA 15 MIN: 43171  -LC     Physical Therapy Interventions (Optional Details)  modalities;manual therapy techniques;lumbar stabilization;home exercise program;patient/family education;postural re-education;strengthening;stretching;ROM (Range of Motion)  -     PT Plan Comments  Pt will contact PT to schedule further appointments if he feels that he needs them.  -LC       User Key  (r) = Recorded By, (t) = Taken By, (c) = Cosigned By    Initials Name Provider Type    LC Jaswinder Ibarra, PT DPT Physical Therapist            Exercises     Row Name 12/11/18 1300             Total Minutes    45036 - PT Therapeutic Exercise Minutes  15  -LC      71871 - PT Manual Therapy Minutes  10  -LC         Exercise 1    Exercise Name 1  low trunk rotation  -LC      Sets 1  2  -LC      Reps 1  10  -LC         Exercise 2    Exercise Name 2  supine hip ADD  -LC      Sets 2  2  -LC      Reps 2  7  -LC      Time 2  3  -LC         Exercise 3    Exercise Name 3  supine hip ABD black  -LC      Sets 3  2  -LC      Reps 3  7  -LC         Exercise 4    Exercise Name 4  DKTC  -LC      Sets 4  2  -LC      Reps 4  5  -LC      Time 4  15  -LC        User Key  (r) = Recorded By, (t) = Taken By, (c) = Cosigned By     Initials Name Provider Type    Jaswinder Kathleen, PT DPT Physical Therapist           Manual Rx (last 36 hours)      Manual Treatments     Row Name 12/11/18 1300             Total Minutes    73395 - PT Manual Therapy Minutes  10  -LC         Manual Rx 1    Manual Rx 1 Location  R hip  -LC      Manual Rx 1 Type  long axis traction, piriformis stretch  -LC      Manual Rx 1 Duration  10 min  -LC        User Key  (r) = Recorded By, (t) = Taken By, (c) = Cosigned By    Initials Name Provider Type    Jaswinder Kathleen, PT DPT Physical Therapist                      Outcome Measure Options: Modifed Owestry  Modified Oswestry  Modified Oswestry Score/Comments: 56%      Time Calculation:     Therapy Suggested Charges     Code   Minutes Charges    71199 (CPT®) Hc Pt Neuromusc Re Education Ea 15 Min      88040 (CPT®) Hc Pt Ther Proc Ea 15 Min 15 1    70771 (CPT®) Hc Gait Training Ea 15 Min      04512 (CPT®) Hc Pt Therapeutic Act Ea 15 Min      47483 (CPT®) Hc Pt Manual Therapy Ea 15 Min 10 1    95432 (CPT®) Hc Pt Ther Massage- Per 15 Min      86741 (CPT®) Hc Pt Iontophoresis Ea 15 Min      18785 (CPT®) Hc Pt Elec Stim Ea-Per 15 Min      59591 (CPT®) Hc Pt Ultrasound Ea 15 Min      19127 (CPT®) Hc Pt Self Care/Mgmt/Train Ea 15 Min      03962 (CPT®) Hc Pt Prosthetic (S) Train Initial Encounter, Each 15 Min      83914 (CPT®) Hc Orthotic(S) Mgmt/Train Initial Encounter, Each 15min      85684 (CPT®) Hc Pt Aquatic Therapy Ea 15 Min      28044 (CPT®) Hc Pt Orthotic(S)/Prosthetic(S) Encounter, Each 15 Min       (CPT®) Hc Pt Electrical Stim Unattended      Total  25 2          Start Time: 1030  Stop Time: 1110  Time Calculation (min): 40 min  Total Timed Code Minutes- PT: 40 minute(s)     Therapy Charges for Today     Code Description Service Date Service Provider Modifiers Qty    39679738650 HC PT MOBILITY CURRENT 12/11/2018 Jaswinder Ibarra, PT DPT GP, CK 1    40718096626 HC PT MOBILITY PROJECTED 12/11/2018 Jaswinder Ibarra, PT  DPT GP, CJ 1    13121450251 HC PT THER PROC EA 15 MIN 12/11/2018 Jaswinder Ibarra, PT DPT GP 1    78368714476 HC PT MANUAL THERAPY EA 15 MIN 12/11/2018 Jaswinder Ibarra, PT DPT GP 1    86310789492 HC PT EVAL MOD COMPLEXITY 1 12/11/2018 Jaswinder Ibarra, PT DPT GP 1          PT G-Codes  PT Professional Judgement Used?: Yes  Outcome Measure Options: Valerie Velazquez  Modified Oswestry Score/Comments: 56%  Functional Limitation: Mobility: Walking and moving around  Mobility: Walking and Moving Around Current Status (): At least 40 percent but less than 60 percent impaired, limited or restricted  Mobility: Walking and Moving Around Goal Status (): At least 20 percent but less than 40 percent impaired, limited or restricted         Jaswinder Ibarra, EDWARD DPT  12/11/2018

## 2018-12-14 ENCOUNTER — TELEPHONE (OUTPATIENT)
Dept: PAIN MEDICINE | Facility: HOSPITAL | Age: 63
End: 2018-12-14

## 2018-12-14 NOTE — TELEPHONE ENCOUNTER
..What percentage of improvement did you have in the first 2 to 3 weeks following you last procedure?  30-40% initially. Now down to 0%, having a different kind of pain. Now having a dull pain instead of sharp.       Have you tried any other treatments since your last treatment? (Chiropractor, physical therapy, massage, yoga, back exercises)  Started PT 2 days ago.     Has your function improved?  (Able to stand longer, walk further)  NO      What medications are you currently taking to help with your pain?  No longer needs to take oxycodone  Chasidy took the phone call- Yadira entered the note.       Remember you must be NPO after midnight the night before your appointment.    You have to be off any blood thinning medications for 1 week prior to the procedure (includes prescription, ASA, fish oil, Vitamin E).    If you become ill/develop a fever please call so we can reschedule your appointment.    Patients receiving sedation must have a  who remains in Pain Management during the appointment.

## 2018-12-20 ENCOUNTER — APPOINTMENT (OUTPATIENT)
Dept: PAIN MEDICINE | Facility: HOSPITAL | Age: 63
End: 2018-12-20

## 2018-12-31 ENCOUNTER — TELEPHONE (OUTPATIENT)
Dept: NEUROSURGERY | Facility: CLINIC | Age: 63
End: 2018-12-31

## 2018-12-31 RX ORDER — NABUMETONE 750 MG/1
TABLET, FILM COATED ORAL
Qty: 60 TABLET | Refills: 0 | Status: SHIPPED | OUTPATIENT
Start: 2018-12-31 | End: 2019-03-26 | Stop reason: SDUPTHER

## 2018-12-31 RX ORDER — METHYLPREDNISOLONE 4 MG/1
TABLET ORAL
Qty: 1 EACH | Refills: 0 | Status: SHIPPED | OUTPATIENT
Start: 2018-12-31 | End: 2019-01-08

## 2018-12-31 NOTE — TELEPHONE ENCOUNTER
Patient's JOSE was denied as his percentage of relief was not greater than 50 % for a day or more. I had to [facility] to call the office back.

## 2018-12-31 NOTE — TELEPHONE ENCOUNTER
Patient's wife called today to say that her  has increased pain. We seen him 12/11/2018 for back pain that radiates into bilateral lower extremity's R>L. His 2nd JOSE was denied by his insurance therefore he did not get that on 12/20/2018. He is scheduled to see you next week on Tuesday 8th. Can we try a MDP in the meantime? I am working with pain mgmt to get his 2nd JOSE approved and then get him in ASAP to get his 2nd JOSE. His ortho doctor sent him in Relafen 750 mg.

## 2019-01-08 ENCOUNTER — OFFICE VISIT (OUTPATIENT)
Dept: NEUROSURGERY | Facility: CLINIC | Age: 64
End: 2019-01-08

## 2019-01-08 VITALS — DIASTOLIC BLOOD PRESSURE: 81 MMHG | SYSTOLIC BLOOD PRESSURE: 121 MMHG | HEART RATE: 81 BPM

## 2019-01-08 DIAGNOSIS — M48.062 SPINAL STENOSIS OF LUMBAR REGION WITH NEUROGENIC CLAUDICATION: Primary | ICD-10-CM

## 2019-01-08 PROCEDURE — 99213 OFFICE O/P EST LOW 20 MIN: CPT | Performed by: NEUROLOGICAL SURGERY

## 2019-01-08 RX ORDER — METHYLPREDNISOLONE 4 MG/1
TABLET ORAL
Refills: 0 | OUTPATIENT
Start: 2019-01-08

## 2019-01-08 RX ORDER — DEXAMETHASONE 4 MG/1
8 TABLET ORAL TAKE AS DIRECTED
Qty: 2 TABLET | Refills: 0 | Status: SHIPPED | OUTPATIENT
Start: 2019-01-08 | End: 2019-08-28

## 2019-01-10 ENCOUNTER — TELEPHONE (OUTPATIENT)
Dept: NEUROSURGERY | Facility: CLINIC | Age: 64
End: 2019-01-10

## 2019-01-10 NOTE — TELEPHONE ENCOUNTER
"Pt has decided to hold off on the myelogram for now as he isn't in much discomfort. He will call back when he would like to reschedule. I did explain that he will have to be seen in office again if it is over 30 days.     He also asked about the \"med-steroid pack\" that was discussed in this office visit. I told him that we sent dexamethasone to his pharmacy but that was only 2 tabs for the myelo. He says that Dr. Vitale and he discussed a full steroid pack. He would like that sent to the pharmacy as soon as possible.  "

## 2019-01-11 RX ORDER — METHYLPREDNISOLONE 4 MG/1
TABLET ORAL
Qty: 1 EACH | Refills: 0 | Status: SHIPPED | OUTPATIENT
Start: 2019-01-11 | End: 2019-08-28

## 2019-01-11 NOTE — TELEPHONE ENCOUNTER
"Patient called back and was advised of the message from Dr. Vitale. He states this was mentioned in his office visit and is for his trip he is taking which will be a month. He asked about getting a 2 week dose of MDP, I told him this specific medication only comes in an actual pack which is 5 days. There is other medication's that can be given over 13 days but it is costly and not usually covered by insurance and that Dr. Vitale only OK 'd the MDP which is 5 days. He \"States he knows this drug\" He will just get more from someone else or something down the road.  "

## 2019-01-11 NOTE — TELEPHONE ENCOUNTER
It is okay to give him 1 more Medrol Dosepak for now.  I would suggest that he hold it in reserve for when the pain really flares up again.  Especially since we won't be able to give him anymore.

## 2019-02-18 ENCOUNTER — DOCUMENTATION (OUTPATIENT)
Dept: PHYSICAL THERAPY | Facility: HOSPITAL | Age: 64
End: 2019-02-18

## 2019-02-18 DIAGNOSIS — R68.89 DECREASED ACTIVITY TOLERANCE: ICD-10-CM

## 2019-02-18 DIAGNOSIS — M54.10 RADICULAR PAIN OF BOTH LOWER EXTREMITIES: ICD-10-CM

## 2019-02-18 DIAGNOSIS — M48.061 SPINAL STENOSIS OF LUMBAR REGION, UNSPECIFIED WHETHER NEUROGENIC CLAUDICATION PRESENT: Primary | ICD-10-CM

## 2019-02-18 DIAGNOSIS — R29.898 WEAKNESS OF BOTH LOWER EXTREMITIES: ICD-10-CM

## 2019-02-18 NOTE — THERAPY DISCHARGE NOTE
Outpatient Physical Therapy Ortho Progress Note/Discharge Summary       Patient Name: EVARISTO Garcia  : 1955  MRN: [id number]  Today's Date: 2019      Visit Date: 2019    Visit Dx:    ICD-10-CM ICD-9-CM   1. Spinal stenosis of lumbar region, unspecified whether neurogenic claudication present M48.061 724.02   2. Weakness of both lower extremities R29.898 729.89   3. Decreased activity tolerance R68.89 780.99   4. Radicular pain of both lower extremities M54.10 724.4       Patient Active Problem List   Diagnosis   • Spinal stenosis of lumbar region with neurogenic claudication        Past Medical History:   Diagnosis Date   • Cancer (CMS/[facility])    • Diabetes mellitus (CMS/[facility])    • Disease of thyroid gland    • Hyperlipidemia    • Low back pain    • Sleep apnea         Past Surgical History:   Procedure Laterality Date   • BACK SURGERY      Double anterior L3, L4, L5   • BACK SURGERY     • EPIDURAL BLOCK     • LAMINECTOMY  ,      L4-L5, L3-L4   • SPINE SURGERY     • THYROIDECTOMY  2015                       PT Assessment/Plan     Row Name 19 1212          PT Assessment    Functional Limitations  Limitations in community activities;Performance in self-care ADL;Limitations in functional capacity and performance;Impaired gait;Performance in leisure activities  -     Impairments  Gait;Impaired flexibility;Muscle strength;Pain;Joint mobility;Posture;Sensation  -     Assessment Comments  Pt did not return for skilled therapy intervention. Will be D/C'd to HEP.  -LC        PT Plan    PT Plan Comments  D/C to HEP.  -       User Key  (r) = Recorded By, (t) = Taken By, (c) = Cosigned By    Initials Name Provider Type    Jaswinder Kathleen, PT DPT Physical Therapist                                 PT OP Goals     Row Name 19 1200          PT Short Term Goals    STG 1  Pt will be independent with HEP to improve lumbar mobility and stability and reduce referred pain  symptoms.  -LC     STG 1 Progress  Not Met  -     STG 2  Pt will have modified oswestry <40%.  -     STG 2 Progress  Not Met  -       User Key  (r) = Recorded By, (t) = Taken By, (c) = Cosigned By    Initials Name Provider Type    Jaswinder Kathleen, PT DPT Physical Therapist                         Time Calculation:      Therapy Suggested Charges     Code   Minutes Charges    None                     OP PT Discharge Summary  Date of Discharge: 02/18/19  Reason for Discharge: Independent  Outcomes Achieved: Refer to plan of care for updates on goals achieved  Discharge Destination: Home with home program      Jaswinder Ibarra, PT DPT  2/18/2019

## 2019-03-21 RX ORDER — NABUMETONE 750 MG/1
TABLET, FILM COATED ORAL
Qty: 60 TABLET | Refills: 0 | OUTPATIENT
Start: 2019-03-21

## 2019-03-26 RX ORDER — NABUMETONE 750 MG/1
TABLET, FILM COATED ORAL
Qty: 60 TABLET | Refills: 0 | Status: SHIPPED | OUTPATIENT
Start: 2019-03-26 | End: 2019-08-28

## 2019-07-05 ENCOUNTER — PREP FOR SURGERY (OUTPATIENT)
Dept: OTHER | Facility: HOSPITAL | Age: 64
End: 2019-07-05

## 2019-07-05 DIAGNOSIS — Z86.010 HISTORY OF COLON POLYPS: Primary | ICD-10-CM

## 2019-07-18 PROBLEM — Z86.010 HISTORY OF COLON POLYPS: Status: ACTIVE | Noted: 2019-07-18

## 2019-08-28 RX ORDER — LEVOTHYROXINE SODIUM 300 UG/1
300 TABLET ORAL DAILY
COMMUNITY

## 2019-08-28 RX ORDER — NAPROXEN SODIUM 220 MG
220 TABLET ORAL 2 TIMES DAILY PRN
COMMUNITY
End: 2020-03-05

## 2019-08-29 ENCOUNTER — ANESTHESIA (OUTPATIENT)
Dept: GASTROENTEROLOGY | Facility: HOSPITAL | Age: 64
End: 2019-08-29

## 2019-08-29 ENCOUNTER — ANESTHESIA EVENT (OUTPATIENT)
Dept: GASTROENTEROLOGY | Facility: HOSPITAL | Age: 64
End: 2019-08-29

## 2019-08-29 ENCOUNTER — HOSPITAL ENCOUNTER (OUTPATIENT)
Facility: HOSPITAL | Age: 64
Setting detail: HOSPITAL OUTPATIENT SURGERY
Discharge: HOME | End: 2019-08-29
Attending: SURGERY | Admitting: SURGERY

## 2019-08-29 VITALS
HEART RATE: 66 BPM | TEMPERATURE: 97.9 F | BODY MASS INDEX: 30.27 KG/M2 | RESPIRATION RATE: 16 BRPM | DIASTOLIC BLOOD PRESSURE: 78 MMHG | SYSTOLIC BLOOD PRESSURE: 109 MMHG | HEIGHT: 71 IN | WEIGHT: 216.2 LBS | OXYGEN SATURATION: 95 %

## 2019-08-29 DIAGNOSIS — Z86.010 HISTORY OF COLON POLYPS: ICD-10-CM

## 2019-08-29 LAB — GLUCOSE BLDC GLUCOMTR-MCNC: 151 MG/DL (ref 70–130)

## 2019-08-29 PROCEDURE — 88305 TISSUE EXAM BY PATHOLOGIST: CPT | Performed by: SURGERY

## 2019-08-29 PROCEDURE — S0260 H&P FOR SURGERY: HCPCS | Performed by: SURGERY

## 2019-08-29 PROCEDURE — 82962 GLUCOSE BLOOD TEST: CPT

## 2019-08-29 PROCEDURE — 45380 COLONOSCOPY AND BIOPSY: CPT | Performed by: SURGERY

## 2019-08-29 PROCEDURE — 45385 COLONOSCOPY W/LESION REMOVAL: CPT | Performed by: SURGERY

## 2019-08-29 PROCEDURE — 25010000002 PROPOFOL 10 MG/ML EMULSION: Performed by: NURSE ANESTHETIST, CERTIFIED REGISTERED

## 2019-08-29 RX ORDER — PROPOFOL 10 MG/ML
VIAL (ML) INTRAVENOUS AS NEEDED
Status: DISCONTINUED | OUTPATIENT
Start: 2019-08-29 | End: 2019-08-29 | Stop reason: SURG

## 2019-08-29 RX ORDER — SODIUM CHLORIDE, SODIUM LACTATE, POTASSIUM CHLORIDE, CALCIUM CHLORIDE 600; 310; 30; 20 MG/100ML; MG/100ML; MG/100ML; MG/100ML
1000 INJECTION, SOLUTION INTRAVENOUS CONTINUOUS
Status: DISCONTINUED | OUTPATIENT
Start: 2019-08-29 | End: 2019-08-29 | Stop reason: HOSPADM

## 2019-08-29 RX ORDER — SODIUM CHLORIDE 0.9 % (FLUSH) 0.9 %
10 SYRINGE (ML) INJECTION AS NEEDED
Status: DISCONTINUED | OUTPATIENT
Start: 2019-08-29 | End: 2019-08-29 | Stop reason: HOSPADM

## 2019-08-29 RX ORDER — SODIUM CHLORIDE, SODIUM LACTATE, POTASSIUM CHLORIDE, CALCIUM CHLORIDE 600; 310; 30; 20 MG/100ML; MG/100ML; MG/100ML; MG/100ML
INJECTION, SOLUTION INTRAVENOUS CONTINUOUS PRN
Status: DISCONTINUED | OUTPATIENT
Start: 2019-08-29 | End: 2019-08-29 | Stop reason: SURG

## 2019-08-29 RX ORDER — LIDOCAINE HYDROCHLORIDE 20 MG/ML
INJECTION, SOLUTION INFILTRATION; PERINEURAL AS NEEDED
Status: DISCONTINUED | OUTPATIENT
Start: 2019-08-29 | End: 2019-08-29 | Stop reason: SURG

## 2019-08-29 RX ORDER — PROPOFOL 10 MG/ML
VIAL (ML) INTRAVENOUS CONTINUOUS PRN
Status: DISCONTINUED | OUTPATIENT
Start: 2019-08-29 | End: 2019-08-29 | Stop reason: SURG

## 2019-08-29 RX ORDER — LIDOCAINE HYDROCHLORIDE 10 MG/ML
0.5 INJECTION, SOLUTION INFILTRATION; PERINEURAL ONCE AS NEEDED
Status: DISCONTINUED | OUTPATIENT
Start: 2019-08-29 | End: 2019-08-29 | Stop reason: HOSPADM

## 2019-08-29 RX ADMIN — SODIUM CHLORIDE, POTASSIUM CHLORIDE, SODIUM LACTATE AND CALCIUM CHLORIDE 1000 ML: 600; 310; 30; 20 INJECTION, SOLUTION INTRAVENOUS at 07:37

## 2019-08-29 RX ADMIN — PROPOFOL 300 MCG/KG/MIN: 10 INJECTION, EMULSION INTRAVENOUS at 08:08

## 2019-08-29 RX ADMIN — LIDOCAINE HYDROCHLORIDE 60 MG: 20 INJECTION, SOLUTION INFILTRATION; PERINEURAL at 08:08

## 2019-08-29 RX ADMIN — SODIUM CHLORIDE, POTASSIUM CHLORIDE, SODIUM LACTATE AND CALCIUM CHLORIDE: 600; 310; 30; 20 INJECTION, SOLUTION INTRAVENOUS at 07:56

## 2019-08-29 RX ADMIN — PROPOFOL 50 MG: 10 INJECTION, EMULSION INTRAVENOUS at 08:08

## 2019-08-29 NOTE — ANESTHESIA POSTPROCEDURE EVALUATION
Patient: EVARISTO Garcia    Procedure Summary     Date:  08/29/19 Room / Location:  [facility] ENDOSCOPY 9 /  [facility]    Anesthesia Start:  0756 Anesthesia Stop:  0909    Procedure:  COLONOSCOPY TO CECUM WITH POLYPECTOMY ( COLD BX/HOT SNARE) (N/A ) Diagnosis:       History of colon polyps      (History of colon polyps [Z86.010])    Surgeon:  Alf Gonzalez Jr., MD Provider:  Rita Davila MD    Anesthesia Type:  MAC ASA Status:  3          Anesthesia Type: MAC  Last vitals  BP   109/78 (08/29/19 0930)   Temp   36.6 °C (97.9 °F) (08/29/19 0730)   Pulse   66 (08/29/19 0930)   Resp   16 (08/29/19 0930)     SpO2   95 % (08/29/19 0930)     Post Anesthesia Care and Evaluation    Patient location during evaluation: PACU  Patient participation: complete - patient participated  Level of consciousness: awake and alert  Pain management: adequate  Airway patency: patent  Anesthetic complications: No anesthetic complications  PONV Status: none  Cardiovascular status: acceptable  Respiratory status: acceptable  Hydration status: acceptable

## 2019-08-29 NOTE — ANESTHESIA PREPROCEDURE EVALUATION
Anesthesia Evaluation     Patient summary reviewed and Nursing notes reviewed   no history of anesthetic complications:  NPO Solid Status: > 8 hours  NPO Liquid Status: > 8 hours           Airway   Mallampati: III  TM distance: <3 FB  Neck ROM: limited  Possible difficult intubation  Dental      Comment: Upper veneers    Pulmonary - negative pulmonary ROS and normal exam    breath sounds clear to auscultation  Cardiovascular - normal exam    Rhythm: regular  Rate: normal    (+) hypertension 2 medications or greater, hyperlipidemia,       Neuro/Psych- negative ROS  GI/Hepatic/Renal/Endo    (+)   diabetes mellitus type 2, hypothyroidism,     Musculoskeletal (-) negative ROS    Abdominal  - normal exam   Substance History - negative use     OB/GYN negative ob/gyn ROS         Other      history of cancer remission                    Anesthesia Plan    ASA 3     MAC     intravenous induction   Anesthetic plan, all risks, benefits, and alternatives have been provided, discussed and informed consent has been obtained with: patient.

## 2019-08-30 LAB
LAB AP CASE REPORT: NORMAL
PATH REPORT.FINAL DX SPEC: NORMAL
PATH REPORT.GROSS SPEC: NORMAL

## 2019-09-09 ENCOUNTER — TELEPHONE (OUTPATIENT)
Dept: SURGERY | Facility: CLINIC | Age: 64
End: 2019-09-09

## 2019-09-09 NOTE — TELEPHONE ENCOUNTER
Spoke w/ patient informed of pathology, recall placed in computer, Health Maintenance tab updated.

## 2019-09-09 NOTE — TELEPHONE ENCOUNTER
----- Message from Alf Gonzalez Jr., MD sent at 9/4/2019  2:27 PM EDT -----  Please contact this patient to inform that the polyps are benign and a repeat colonoscopy is needed in 5 years.  Please place in recall for a colonoscopy in 5 years.  Thanks.

## 2020-03-05 ENCOUNTER — OFFICE VISIT (OUTPATIENT)
Dept: ORTHOPEDIC SURGERY | Facility: CLINIC | Age: 65
End: 2020-03-05

## 2020-03-05 VITALS — HEIGHT: 71 IN | TEMPERATURE: 97.3 F | BODY MASS INDEX: 30.72 KG/M2 | WEIGHT: 219.4 LBS

## 2020-03-05 DIAGNOSIS — M25.521 RIGHT ELBOW PAIN: Primary | ICD-10-CM

## 2020-03-05 DIAGNOSIS — L03.113 CELLULITIS OF RIGHT UPPER EXTREMITY: ICD-10-CM

## 2020-03-05 DIAGNOSIS — S52.124A CLOSED NONDISPLACED FRACTURE OF HEAD OF RIGHT RADIUS, INITIAL ENCOUNTER: ICD-10-CM

## 2020-03-05 DIAGNOSIS — T14.8XXA BONE BRUISE: ICD-10-CM

## 2020-03-05 PROCEDURE — 73070 X-RAY EXAM OF ELBOW: CPT | Performed by: NURSE PRACTITIONER

## 2020-03-05 PROCEDURE — 99213 OFFICE O/P EST LOW 20 MIN: CPT | Performed by: NURSE PRACTITIONER

## 2020-03-05 RX ORDER — AMOXICILLIN AND CLAVULANATE POTASSIUM 875; 125 MG/1; MG/1
1 TABLET, FILM COATED ORAL 2 TIMES DAILY
Qty: 20 TABLET | Refills: 0 | Status: SHIPPED | OUTPATIENT
Start: 2020-03-05 | End: 2020-05-19

## 2020-03-05 NOTE — PROGRESS NOTES
Patient Name: EVARISTO Garcia   YOB: 1955  Referring Primary Care Physician: Seema Clemente MD  BMI: Body mass index is 30.6 kg/m².    Chief Complaint:    Chief Complaint   Patient presents with   • Right Elbow - Establish Care, Pain        HPI: New pt to me presents s/p fall down wooden steps 1 week ago and hit elbow and forearm. Pt and personal friend of [place]. Pt has pain to forearm and redness, abrasion. Reports pain in elbow initially with movement that has improved. PMH type 2 diabetes. Works in [employer].    EVARISTO Garcia is a 64 y.o. male who presents today for evaluation of   Chief Complaint   Patient presents with   • Right Elbow - Establish Care, Pain     This problem is new to this examiner.     Subjective   Medications:   Home Medications:  Current Outpatient Medications on File Prior to Visit   Medication Sig   • amLODIPine-atorvastatin (CADUET) 5-20 MG per tablet Take 1 tablet by mouth Daily.   • fenofibrate (TRICOR) 48 MG tablet Take 48 mg by mouth Daily.   • levothyroxine (SYNTHROID, LEVOTHROID) 300 MCG tablet Take 300 mcg by mouth Daily.   • metFORMIN (GLUCOPHAGE) 1000 MG tablet Take 1,000 mg by mouth Daily.     No current facility-administered medications on file prior to visit.      Current Medications:  Scheduled Meds:  Continuous Infusions:  No current facility-administered medications for this visit.   PRN Meds:.    I have reviewed the patient's medical history in detail and updated the computerized patient record.  Review and summarization of old records includes:    Past Medical History:   Diagnosis Date   • Cancer (CMS/HCC)     THYROID. COMPLETED THYROIDECTOMY WITH RADIATION   • Chronic low back pain    • Diabetes mellitus (CMS/HCC)     TYPE 2   • Disease of thyroid gland    • History of sleep apnea     WEIGHT LOSS   • Hyperlipidemia     WITH HIGH TRYGLCERIDES   • Hypertension    • Hypothyroidism         Past Surgical History:   Procedure Laterality Date   • BACK SURGERY  1988     "Double anterior L3, L4, L5   • BACK SURGERY  2015   • COLONOSCOPY     • COLONOSCOPY N/A 8/29/2019    Procedure: COLONOSCOPY TO CECUM WITH POLYPECTOMY ( COLD BX/HOT SNARE);  Surgeon: Alf Gonzalez Jr., MD;  Location: [facility] ENDOSCOPY;  Service: General   • EPIDURAL BLOCK     • EYE SURGERY     • LAMINECTOMY  1976, 1978     L4-L5, L3-L4   • LASIK     • THYROIDECTOMY  12/2015    COMPLETE   • TONSILLECTOMY     • VASECTOMY          Social History     Occupational History   • Not on file   Tobacco Use   • Smoking status: Never Smoker   • Smokeless tobacco: Never Used   Substance and Sexual Activity   • Alcohol use: Yes     Comment: ON OCC   • Drug use: No   • Sexual activity: Not on file      Social History     Social History Narrative   • Not on file        Family History   Problem Relation Age of Onset   • Malig Hyperthermia Neg Hx        ROS: 14 point review of systems was performed and all other systems were reviewed and are negative except for documented findings in HPI and today's encounter.     Allergies: No Known Allergies  Constitutional:  Denies fever, shaking or chills   Eyes:  Denies change in visual acuity   HENT:  Denies nasal congestion or sore throat   Respiratory:  Denies cough or shortness of breath   Cardiovascular:  Denies chest pain or severe LE edema   GI:  Denies abdominal pain, nausea, vomiting, bloody stools or diarrhea   Musculoskeletal:  Numbness, tingling, pain, or loss of motor function only as noted above in history of present illness.  : Denies painful urination or hematuria  Integument:  Denies rash, lesion or ulceration   Neurologic:  Denies headache or focal weakness  Endocrine:  Denies lymphadenopathy  Psych:  Denies confusion or change in mental status   Hem:  Denies active bleeding    OBJECTIVE:  Physical Exam:   Temp 97.3 °F (36.3 °C) (Temporal)   Ht 180.3 cm (71\")   Wt 99.5 kg (219 lb 6.4 oz)   BMI 30.60 kg/m²     General Appearance:    Alert, cooperative, in no acute distress  "                 Eyes: conjunctiva clear  ENT: external ears and nose atraumatic  CV: no peripheral edema  Resp: normal respiratory effort  Skin: no rashes or wounds; normal turgor  Psych: mood and affect appropriate  Lymph: no nodes appreciated  Neuro: gross sensation intact  Vascular:  Palpable peripheral pulse in noted extremity  Musculoskeletal Extremities: point tender to radial head with some pain with supination. Pt has effusion to elbow. [id number] superficial abrasion distal dorsal humeral aspect with am area of erythema/eccymosis that is very tender to mid aspect of forearm [id number]. NVI, +PMS.     Radiology:   subtlle ant/post fat pad -no dislocation or radial head fracture     Assessment:     ICD-10-CM ICD-9-CM   1. Right elbow pain M25.521 719.42   2. Closed nondisplaced fracture of head of right radius, initial encounter S52.124A 813.05   3. Cellulitis of right upper extremity L03.113 682.3   4. Bone bruise T14.8XXA 924.9        Procedures   placed in a sling - will cover with antibiotics and have pt monitor area of tenderness for worsening symptoms, swelling, fever or inability to move arm.   Plan: Biomechanics of pertinent body area discussed.  Risks, benefits, alternatives, comparisons, and complications of accepted medicines, injections, recommendations, surgical procedures, and therapies explained and education provided in laymen's terms. Natural history and expected course of this patient's diagnosis discussed along with evaluation of therapies. Questions answered. When appropriate I also discussed proper use of cane, walker, trekking poles.   RICE: Rest, ice, compression, and elevation therapy, Cryotherapy/brachy therapy, and or OTC linaments as indicated with instructions.   Discussed calling to schedule MRI if not significantly better with current treatment.       3/6/2020    Much of this encounter note is an electronic transcription/translation of spoken language to printed text. The electronic  translation of spoken language may permit erroneous, or at times, nonsensical words or phrases to be inadvertently transcribed; Although I have reviewed the note for such errors, some may still exist

## 2020-05-19 ENCOUNTER — OFFICE VISIT (OUTPATIENT)
Dept: ORTHOPEDIC SURGERY | Facility: CLINIC | Age: 65
End: 2020-05-19

## 2020-05-19 VITALS — BODY MASS INDEX: 29.4 KG/M2 | TEMPERATURE: 97.2 F | WEIGHT: 210 LBS | HEIGHT: 71 IN

## 2020-05-19 DIAGNOSIS — M54.50 LUMBAR SPINE PAIN: Primary | ICD-10-CM

## 2020-05-19 DIAGNOSIS — M48.062 SPINAL STENOSIS OF LUMBAR REGION WITH NEUROGENIC CLAUDICATION: ICD-10-CM

## 2020-05-19 PROCEDURE — 72100 X-RAY EXAM L-S SPINE 2/3 VWS: CPT | Performed by: ORTHOPAEDIC SURGERY

## 2020-05-19 PROCEDURE — 99214 OFFICE O/P EST MOD 30 MIN: CPT | Performed by: ORTHOPAEDIC SURGERY

## 2020-05-19 RX ORDER — HYDROCODONE BITARTRATE AND ACETAMINOPHEN 5; 325 MG/1; MG/1
1 TABLET ORAL EVERY 12 HOURS PRN
Qty: 40 TABLET | Refills: 0 | Status: SHIPPED | OUTPATIENT
Start: 2020-05-19 | End: 2021-09-21

## 2020-05-19 NOTE — PROGRESS NOTES
More the same in the lower extremities but worse.  Mostly back pain left leg pain, predominantly the latter.  He has a longstanding foot drop as well as some calve atrophy related to previous stenosis for which he underwent multiple surgeries in the remote past as detailed before.  No new complaints other than increasing pain.  He is requesting pain medication.  He saw Dr. Rondon who recommended surgery at L3-4.  Constitutional: Vital signs above-noted.  Awake, alert and oriented    Psychiatric: Affect and insight do not appear grossly disturbed.    Pulmonary: Breathing is unlabored, color is good.    Skin: Warm, dry and normal turgor    Cardiac: Pedal pulses intact.  No edema.    Eyesight and hearing appear adequate for examination purposes      Musculoskeletal:  There is some tenderness to percussion and palpation of the spine. Motion appears stiff.  Posture is unremarkable to coronal and sagittal inspection.    The skin about the area is well-healed.  There is no palpable or visible deformity.  There is no local spasm.       Neurologic:   Reflexes are absent in the patellae and achilles.   Motor function is undisturbed in quadriceps, EHL, and gastrocnemius on the right but he has a foot drop on the left   sensation appears symmetrically intact to light touch.  In the bilateral lower extremities there is on the left there is 2 inches of atrophy in the left calf and the right appears well-developed.   Clonus is absent..  Gait appears undisturbed.  SLR test negative    I reviewed the MRI from 2018 which shows moderate stenosis at L3-4 above the fusion and looks good above that.  X-rays today demonstrate market degenerative change at 3 4 and milder changes above overall good posture.  Of course he is fused L4-S1.    I agree with Dr. Poole that I think the surgery would help with his current leg pain and reduce likelihood of further neurologic deterioration.  At present however it seems that his current issues are  longstanding and more related to the previously operated levels.  In addition he wants to avoid surgery.  I am recommending epidural injections.  He is requested hydrocodone and states his wife had some leftover that he takes about every other day.  I told him this is not the best plan but it may be more effective than taking something weaker on a regular basis.  If he sticks to this long-term he is going to need to go to a pain clinic but I did give him hydrocodone 1 every 12 and told him to try to take 1 every 2 or 3 days if possible.  I told him I let him know if it becomes a problem and then will need to go to pain clinic.  I also suggested that long-term narcotic pain medication is probably not the best way to manage this and that I do agree with Dr. Rondon that surgery could be helpful for him.

## 2020-06-09 ENCOUNTER — HOSPITAL ENCOUNTER (OUTPATIENT)
Dept: GENERAL RADIOLOGY | Facility: HOSPITAL | Age: 65
Discharge: HOME | End: 2020-06-09

## 2020-06-09 ENCOUNTER — ANESTHESIA EVENT (OUTPATIENT)
Dept: PAIN MEDICINE | Facility: HOSPITAL | Age: 65
End: 2020-06-09

## 2020-06-09 ENCOUNTER — HOSPITAL ENCOUNTER (OUTPATIENT)
Dept: PAIN MEDICINE | Facility: HOSPITAL | Age: 65
Discharge: HOME | End: 2020-06-09
Admitting: ANESTHESIOLOGY

## 2020-06-09 ENCOUNTER — ANESTHESIA (OUTPATIENT)
Dept: PAIN MEDICINE | Facility: HOSPITAL | Age: 65
End: 2020-06-09

## 2020-06-09 VITALS
OXYGEN SATURATION: 92 % | RESPIRATION RATE: 16 BRPM | BODY MASS INDEX: 29.12 KG/M2 | HEIGHT: 72 IN | HEART RATE: 64 BPM | DIASTOLIC BLOOD PRESSURE: 89 MMHG | SYSTOLIC BLOOD PRESSURE: 135 MMHG | WEIGHT: 215 LBS | TEMPERATURE: 98 F

## 2020-06-09 DIAGNOSIS — M48.062 SPINAL STENOSIS OF LUMBAR REGION WITH NEUROGENIC CLAUDICATION: Primary | ICD-10-CM

## 2020-06-09 DIAGNOSIS — R52 PAIN: ICD-10-CM

## 2020-06-09 LAB — GLUCOSE BLDC GLUCOMTR-MCNC: 159 MG/DL (ref 70–130)

## 2020-06-09 PROCEDURE — 0 IOPAMIDOL 41 % SOLUTION: Performed by: ANESTHESIOLOGY

## 2020-06-09 PROCEDURE — 77003 FLUOROGUIDE FOR SPINE INJECT: CPT

## 2020-06-09 PROCEDURE — C1755 CATHETER, INTRASPINAL: HCPCS

## 2020-06-09 PROCEDURE — 25010000002 FENTANYL CITRATE (PF) 100 MCG/2ML SOLUTION: Performed by: ANESTHESIOLOGY

## 2020-06-09 PROCEDURE — 25010000002 MIDAZOLAM PER 1 MG: Performed by: ANESTHESIOLOGY

## 2020-06-09 PROCEDURE — 25010000002 METHYLPREDNISOLONE PER 80 MG: Performed by: ANESTHESIOLOGY

## 2020-06-09 PROCEDURE — 82962 GLUCOSE BLOOD TEST: CPT

## 2020-06-09 RX ORDER — LIDOCAINE HYDROCHLORIDE 10 MG/ML
0.5 INJECTION, SOLUTION INFILTRATION; PERINEURAL ONCE AS NEEDED
Status: DISCONTINUED | OUTPATIENT
Start: 2020-06-09 | End: 2020-06-10 | Stop reason: HOSPADM

## 2020-06-09 RX ORDER — FENTANYL CITRATE 50 UG/ML
50 INJECTION, SOLUTION INTRAMUSCULAR; INTRAVENOUS AS NEEDED
Status: DISCONTINUED | OUTPATIENT
Start: 2020-06-09 | End: 2020-06-10 | Stop reason: HOSPADM

## 2020-06-09 RX ORDER — SODIUM CHLORIDE 0.9 % (FLUSH) 0.9 %
3 SYRINGE (ML) INJECTION EVERY 12 HOURS SCHEDULED
Status: DISCONTINUED | OUTPATIENT
Start: 2020-06-09 | End: 2020-06-10 | Stop reason: HOSPADM

## 2020-06-09 RX ORDER — MIDAZOLAM HYDROCHLORIDE 1 MG/ML
1 INJECTION INTRAMUSCULAR; INTRAVENOUS AS NEEDED
Status: DISCONTINUED | OUTPATIENT
Start: 2020-06-09 | End: 2020-06-10 | Stop reason: HOSPADM

## 2020-06-09 RX ORDER — SODIUM CHLORIDE 0.9 % (FLUSH) 0.9 %
3-10 SYRINGE (ML) INJECTION AS NEEDED
Status: DISCONTINUED | OUTPATIENT
Start: 2020-06-09 | End: 2020-06-10 | Stop reason: HOSPADM

## 2020-06-09 RX ORDER — SODIUM CHLORIDE 0.9 % (FLUSH) 0.9 %
1-10 SYRINGE (ML) INJECTION AS NEEDED
Status: DISCONTINUED | OUTPATIENT
Start: 2020-06-09 | End: 2020-06-10 | Stop reason: HOSPADM

## 2020-06-09 RX ORDER — LIDOCAINE HYDROCHLORIDE 10 MG/ML
1 INJECTION, SOLUTION INFILTRATION; PERINEURAL ONCE AS NEEDED
Status: DISCONTINUED | OUTPATIENT
Start: 2020-06-09 | End: 2020-06-10 | Stop reason: HOSPADM

## 2020-06-09 RX ORDER — METHYLPREDNISOLONE ACETATE 80 MG/ML
80 INJECTION, SUSPENSION INTRA-ARTICULAR; INTRALESIONAL; INTRAMUSCULAR; SOFT TISSUE ONCE
Status: COMPLETED | OUTPATIENT
Start: 2020-06-09 | End: 2020-06-09

## 2020-06-09 RX ADMIN — FENTANYL CITRATE 100 MCG: 50 INJECTION, SOLUTION INTRAMUSCULAR; INTRAVENOUS at 10:40

## 2020-06-09 RX ADMIN — IOPAMIDOL 10 ML: 408 INJECTION, SOLUTION INTRATHECAL at 10:54

## 2020-06-09 RX ADMIN — MIDAZOLAM 2 MG: 1 INJECTION INTRAMUSCULAR; INTRAVENOUS at 10:41

## 2020-06-09 RX ADMIN — METHYLPREDNISOLONE ACETATE 80 MG: 80 INJECTION, SUSPENSION INTRA-ARTICULAR; INTRALESIONAL; INTRAMUSCULAR; SOFT TISSUE at 10:54

## 2020-06-09 NOTE — H&P
[facility]    History and Physical    Patient Name: EVARISTO Garcia  :  1955  MRN:  [id number]  Date of Admission: 2020    Subjective     Patient is a 64 y.o. male presents with chief complaint of chronic, intermitent, moderate, severe low back and right thigh pain.  Onset of symptoms was gradual starting 6 months ago.  Symptoms are associated/aggravated by activity. Symptoms improve with pain medication, lying down and rest.  He has undergone a previous lumbar fusion at L4-5 and L5-S1. On pain scale from 0-10, he rates his pain is a 4 while at rest and a 10 with activity.  He describes the pain as sharp and stabbing in nature.  He was referred to the pain clinic for a series of lumbar epidural steroid injections.    His lumbar MRI shows spinal stenosis at L3-4.    The following portions of the patients history were reviewed and updated as appropriate: current medications, allergies, past medical history, past surgical history, past family history, past social history and problem list                Objective     Past Medical History:   Past Medical History:   Diagnosis Date   • Cancer (CMS/HCC)     THYROID. COMPLETED THYROIDECTOMY WITH RADIATION   • Chronic low back pain    • Diabetes mellitus (CMS/HCC)     TYPE 2   • Disease of thyroid gland    • History of sleep apnea     WEIGHT LOSS   • Hyperlipidemia     WITH HIGH TRYGLCERIDES   • Hypertension    • Hypothyroidism      Past Surgical History:   Past Surgical History:   Procedure Laterality Date   • BACK SURGERY      Double anterior L3, L4, L5   • BACK SURGERY     • COLONOSCOPY     • COLONOSCOPY N/A 2019    Procedure: COLONOSCOPY TO CECUM WITH POLYPECTOMY ( COLD BX/HOT SNARE);  Surgeon: Alf Gonzalez Jr., MD;  Location: [facility] ENDOSCOPY;  Service: General   • EPIDURAL BLOCK     • EYE SURGERY     • LAMINECTOMY  1978     L4-L5, L3-L4   • LASIK     • NECK SURGERY     • THYROIDECTOMY  2015    COMPLETE   • TONSILLECTOMY     •  "VASECTOMY       Family History:   Family History   Problem Relation Age of Onset   • Malig Hyperthermia Neg Hx      Social History:   Social History     Tobacco Use   • Smoking status: Never Smoker   • Smokeless tobacco: Never Used   Substance Use Topics   • Alcohol use: Yes     Comment: ON OCC   • Drug use: No       Vital Signs Range for the last 24 hours  Temperature: Temp:  [36.7 °C (98 °F)] 36.7 °C (98 °F)   Temp Source: Temp src: Infrared   BP: BP: (144)/(94) 144/94   Pulse: Heart Rate:  [72] 72   Respirations: Resp:  [16] 16   SPO2: SpO2:  [93 %] 93 %   O2 Amount (l/min):     O2 Devices Device (Oxygen Therapy): room air   Weight: Weight:  [97.5 kg (215 lb)] 97.5 kg (215 lb)     Flowsheet Rows      First Filed Value   Admission Height  182.9 cm (72\") Documented at 06/09/2020 1017   Admission Weight  97.5 kg (215 lb) Documented at 06/09/2020 1017          --------------------------------------------------------------------------------    Current Outpatient Medications   Medication Sig Dispense Refill   • amLODIPine-atorvastatin (CADUET) 5-20 MG per tablet Take 1 tablet by mouth Daily.     • fenofibrate (TRICOR) 48 MG tablet Take 48 mg by mouth Daily.     • HYDROcodone-acetaminophen (NORCO) 5-325 MG per tablet Take 1 tablet by mouth Every 12 (Twelve) Hours As Needed for Mild Pain . 40 tablet 0   • levothyroxine (SYNTHROID, LEVOTHROID) 300 MCG tablet Take 300 mcg by mouth Daily.     • metFORMIN (GLUCOPHAGE) 1000 MG tablet Take 1,000 mg by mouth Daily.       Current Facility-Administered Medications   Medication Dose Route Frequency Provider Last Rate Last Dose   • lidocaine (XYLOCAINE) 1 % injection 0.5 mL  0.5 mL Intradermal Once PRN Carlos Wolfe MD       • sodium chloride 0.9 % flush 3 mL  3 mL Intravenous Q12H Carlos Wolfe MD       • sodium chloride 0.9 % flush 3-10 mL  3-10 mL Intravenous PRN Carlos Wolfe MD     "       --------------------------------------------------------------------------------  Assessment/Plan      Anesthesia Evaluation     Patient summary reviewed and Nursing notes reviewed   NPO Solid Status: > 8 hours  NPO Liquid Status: > 2 hours    Pain impairs ability to perform ADLs: Dressing, Meal prep/Eating, Driving and Sleeping  Modalities previously tried to control pain with limited effectiveness within the last 4-6 weeks: Prescription medications and Rest     Airway   Mallampati: II  TM distance: >3 FB  Neck ROM: full  Dental - normal exam     Pulmonary - negative pulmonary ROS and normal exam    breath sounds clear to auscultation  Cardiovascular - normal exam    Rhythm: regular  Rate: normal    (+) hypertension, hyperlipidemia,   (-) angina, orthopnea, PND, AVENDANO      Neuro/Psych- negative ROS  GI/Hepatic/Renal/Endo    (+)   diabetes mellitus,     Musculoskeletal (-) negative ROS    Abdominal  - normal exam    Abdomen: soft.  Bowel sounds: normal.   Substance History - negative use     OB/GYN negative ob/gyn ROS         Other      history of cancer        Phys Exam Other:     NECK:  Supple without adenopathy, JVD or bruits.    EXTREMITIES:  There is no clubbing, cyanosis or edema.  Radial pulses are 1+ and equal bilaterally.  Examination of the lumbar spine shows no marked tenderness or discoloration.    SKIN:  Warm and dry.    NEUROLOGICAL EXAM:  Alert and oriented ×3.  Extraocular muscles intact.  Strength is normal and symmetrical in the lower extremities with respect to dorsal and plantar flexion of the ankles and quadriceps.           Diagnosis and Plan    Treatment Plan  ASA 3      Procedures: Lumbar Epidural Steroid Injection(LESI), With fluoroscopy,       Anesthetic plan and risks discussed with patient.        Alternative management options include physical therapy, medical management with nonsteroidal anti-inflammatory medications or narcotics, chiropractic manipulation and surgical  intervention.    Diagnosis     * Degeneration of lumbar intervertebral disc [M51.36]     * Spinal stenosis of lumbar region with neurogenic claudication [M48.062]     * Lumbar radiculopathy [M54.16]      1.  Lumbar 4 epidural steroid injections, up to 3, spaced 1-2 weeks apart.  If pain control is acceptable after 1 or 2 injections, it was discussed with the patient that they may return for the subsequent injections if and when their pain returns.  The risks were discussed with the patient including failure of relief, worsening pain, Headache (post dural puncture headache), bleeding (epidural hematoma) and infection (epidural abscess or skin infection).  2.  Physical therapy exercises at home as prescribed by physical therapy or from the pain clinic handout (given to the patient).  Continuation of these exercises every day, or multiple times per week, even when the patient has good pain relief, was stressed to the patient as a preventative measure to decrease the frequency and severity of future pain episodes.  3.  Continue pain medicines as already prescribed.  If patient not currently taking any, it is recommended to begin Acetaminophen 1000 mg po q 8 hours.  If other medicines containing Acetaminophen are currently prescribed, maintain daily dose at 3000 mg.    4.  If they can tolerate NSAIDS, it is recommended to take Ibuprofen 600 mg po q 6 hours for 7 days during pain exacerbations.  Alternatively, they may substitute an NSAID of their choice (e.g. Aleve).  This may be taken at the same time as Acetaminophen.  5.  Heat and ice to the affected area as tolerated for pain control.  It was discussed that heating pads can cause burns.  6.  Daily low impact exercise such as walking or water exercise was recommended to maintain overall health and aid in weight control.   7.  Follow up as needed for subsequent injections.  8.  Patient was counseled to abstain from tobacco products.

## 2020-06-09 NOTE — ANESTHESIA PROCEDURE NOTES
PAIN Epidural block      Patient reassessed immediately prior to procedure    Patient location during procedure: pain clinic  Start Time: 6/9/2020 10:38 AM  Stop Time: 6/9/2020 10:55 AM  Indication:procedure for pain  Performed By  Anesthesiologist: Carlos Wolfe MD  Preanesthetic Checklist  Completed: patient identified, site marked, surgical consent, pre-op evaluation, timeout performed, risks and benefits discussed and monitors and equipment checked  Additional Notes  Post-Op Diagnosis Codes:     * Degeneration of lumbar intervertebral disc (M51.36)     * Spinal stenosis of lumbar region with neurogenic claudication (M48.062)     * Lumbar radiculopathy (M54.16)     * Previous lumbar fusion at L4-5 and L5-S1    I attempted at the L3-4 interspace using a right paramedian approach, but I was unable to access the epidural space at this level due to bony interference.    The patient was observed in recovery with no evidence of neurological deficits or other problems. All questions were answered. The patient was discharged with appropriate instructions.  Prep:  Pt Position:prone  Sterile Tech:cap, gloves, mask and sterile barrier  Prep:chlorhexidine gluconate and isopropyl alcohol  Monitoring:blood pressure monitoring, continuous pulse oximetry and EKG  Procedure:Sedation: yes (Midazolam 2 mg and fentanyl 100 mcg IV)     Approach:right paramedian  Guidance: fluoroscopy  Location:lumbar  Level:2-3 (Interlaminar)  Needle Type:Tuohy  Needle Gauge:20 G  Aspiration:negative  Medications:  Depomedrol:80 mg  Preservative Free Saline:3mL  Isovue:2mL  Comments:Isovue dye spread was consistent with epidural placement.  Post Assessment:  Dressing:occlusive dressing applied  Pt Tolerance:patient tolerated the procedure well with no apparent complications  Complications:no

## 2020-07-07 ENCOUNTER — APPOINTMENT (OUTPATIENT)
Dept: PAIN MEDICINE | Facility: HOSPITAL | Age: 65
End: 2020-07-07

## 2020-09-30 ENCOUNTER — OFFICE VISIT (OUTPATIENT)
Dept: ORTHOPEDIC SURGERY | Facility: CLINIC | Age: 65
End: 2020-09-30

## 2020-09-30 VITALS — TEMPERATURE: 97.5 F | BODY MASS INDEX: 27.77 KG/M2 | WEIGHT: 205 LBS | HEIGHT: 72 IN

## 2020-09-30 DIAGNOSIS — M48.062 SPINAL STENOSIS OF LUMBAR REGION WITH NEUROGENIC CLAUDICATION: Primary | ICD-10-CM

## 2020-09-30 PROCEDURE — 99213 OFFICE O/P EST LOW 20 MIN: CPT | Performed by: ORTHOPAEDIC SURGERY

## 2020-09-30 NOTE — PROGRESS NOTES
Is seen in follow-up for back and leg pain-see note from May 2020.  Persistent shooting pain into the left thigh and of course she is status post L4-S1 anterior and posterior fusion performed many years ago.  On exam he has 2 inches of calve atrophy on the left which is chronic and a foot drop as well but good strength in the knee.  Reflexes are absent in the patella and Achilles.  Plain film x-rays of the lumbar spine obtained recently demonstrate a solid fusion L4-S1 slight scoliosis above degenerative changes at 3 4 and to lesser extent at 2 3.  MRI from 2018 showed moderate stenosis at 3 4.  At this point I am going to repeat MRI assess the extent of damage she is not ready for surgery but epidurals could be a good option as long as he is not losing strength and I believe all his current weakness is chronic.  I will call him with results of the MRI.

## 2020-10-12 ENCOUNTER — HOSPITAL ENCOUNTER (OUTPATIENT)
Dept: MRI IMAGING | Facility: HOSPITAL | Age: 65
Discharge: HOME | End: 2020-10-12
Admitting: ORTHOPAEDIC SURGERY

## 2020-10-12 DIAGNOSIS — M48.062 SPINAL STENOSIS OF LUMBAR REGION WITH NEUROGENIC CLAUDICATION: ICD-10-CM

## 2020-10-12 LAB — CREAT BLDA-MCNC: 1 MG/DL (ref 0.6–1.3)

## 2020-10-12 PROCEDURE — 72158 MRI LUMBAR SPINE W/O & W/DYE: CPT

## 2020-10-12 PROCEDURE — A9577 INJ MULTIHANCE: HCPCS | Performed by: ORTHOPAEDIC SURGERY

## 2020-10-12 PROCEDURE — 0 GADOBENATE DIMEGLUMINE 529 MG/ML SOLUTION: Performed by: ORTHOPAEDIC SURGERY

## 2020-10-12 PROCEDURE — 82565 ASSAY OF CREATININE: CPT

## 2020-10-12 RX ADMIN — GADOBENATE DIMEGLUMINE 19 ML: 529 INJECTION, SOLUTION INTRAVENOUS at 14:45

## 2020-10-14 ENCOUNTER — HOSPITAL ENCOUNTER (OUTPATIENT)
Dept: PAIN MEDICINE | Facility: HOSPITAL | Age: 65
Discharge: HOME | End: 2020-10-14

## 2020-10-14 ENCOUNTER — ANESTHESIA EVENT (OUTPATIENT)
Dept: PAIN MEDICINE | Facility: HOSPITAL | Age: 65
End: 2020-10-14

## 2020-10-14 ENCOUNTER — HOSPITAL ENCOUNTER (OUTPATIENT)
Dept: GENERAL RADIOLOGY | Facility: HOSPITAL | Age: 65
Discharge: HOME | End: 2020-10-14

## 2020-10-14 ENCOUNTER — ANESTHESIA (OUTPATIENT)
Dept: PAIN MEDICINE | Facility: HOSPITAL | Age: 65
End: 2020-10-14

## 2020-10-14 VITALS
DIASTOLIC BLOOD PRESSURE: 93 MMHG | HEART RATE: 63 BPM | RESPIRATION RATE: 16 BRPM | OXYGEN SATURATION: 95 % | SYSTOLIC BLOOD PRESSURE: 115 MMHG

## 2020-10-14 DIAGNOSIS — R52 PAIN: ICD-10-CM

## 2020-10-14 DIAGNOSIS — M48.062 SPINAL STENOSIS OF LUMBAR REGION WITH NEUROGENIC CLAUDICATION: Primary | ICD-10-CM

## 2020-10-14 PROCEDURE — C1755 CATHETER, INTRASPINAL: HCPCS

## 2020-10-14 PROCEDURE — 0 IOPAMIDOL 41 % SOLUTION: Performed by: ANESTHESIOLOGY

## 2020-10-14 PROCEDURE — 25010000002 METHYLPREDNISOLONE PER 80 MG: Performed by: ANESTHESIOLOGY

## 2020-10-14 PROCEDURE — 25010000002 MIDAZOLAM PER 1 MG: Performed by: ANESTHESIOLOGY

## 2020-10-14 PROCEDURE — 77003 FLUOROGUIDE FOR SPINE INJECT: CPT

## 2020-10-14 PROCEDURE — 25010000002 FENTANYL CITRATE (PF) 100 MCG/2ML SOLUTION: Performed by: ANESTHESIOLOGY

## 2020-10-14 RX ORDER — MIDAZOLAM HYDROCHLORIDE 1 MG/ML
1 INJECTION INTRAMUSCULAR; INTRAVENOUS AS NEEDED
Status: DISCONTINUED | OUTPATIENT
Start: 2020-10-14 | End: 2020-10-15 | Stop reason: HOSPADM

## 2020-10-14 RX ORDER — LIDOCAINE HYDROCHLORIDE 10 MG/ML
1 INJECTION, SOLUTION INFILTRATION; PERINEURAL ONCE AS NEEDED
Status: DISCONTINUED | OUTPATIENT
Start: 2020-10-14 | End: 2020-10-15 | Stop reason: HOSPADM

## 2020-10-14 RX ORDER — FENTANYL CITRATE 50 UG/ML
50 INJECTION, SOLUTION INTRAMUSCULAR; INTRAVENOUS AS NEEDED
Status: DISCONTINUED | OUTPATIENT
Start: 2020-10-14 | End: 2020-10-15 | Stop reason: HOSPADM

## 2020-10-14 RX ORDER — SODIUM CHLORIDE 0.9 % (FLUSH) 0.9 %
1-10 SYRINGE (ML) INJECTION AS NEEDED
Status: DISCONTINUED | OUTPATIENT
Start: 2020-10-14 | End: 2020-10-15 | Stop reason: HOSPADM

## 2020-10-14 RX ORDER — METHYLPREDNISOLONE ACETATE 80 MG/ML
80 INJECTION, SUSPENSION INTRA-ARTICULAR; INTRALESIONAL; INTRAMUSCULAR; SOFT TISSUE ONCE
Status: COMPLETED | OUTPATIENT
Start: 2020-10-14 | End: 2020-10-14

## 2020-10-14 RX ADMIN — MIDAZOLAM 2 MG: 1 INJECTION INTRAMUSCULAR; INTRAVENOUS at 14:46

## 2020-10-14 RX ADMIN — FENTANYL CITRATE 100 MCG: 50 INJECTION INTRAMUSCULAR; INTRAVENOUS at 14:46

## 2020-10-14 RX ADMIN — IOPAMIDOL 2 ML: 408 INJECTION, SOLUTION INTRATHECAL at 14:50

## 2020-10-14 RX ADMIN — METHYLPREDNISOLONE ACETATE 80 MG: 80 INJECTION, SUSPENSION INTRA-ARTICULAR; INTRALESIONAL; INTRAMUSCULAR; SOFT TISSUE at 14:50

## 2020-10-14 NOTE — H&P
INTERVAL HISTORY:    The patient returns for another Lumbar epidural steroid injection today.  They have received 50-60% improvement since their last injection with a pain level of 4/10 at its worst recently.    Conservative measures tried in the interim     Current Outpatient Medications on File Prior to Encounter   Medication Sig Dispense Refill   • amLODIPine-atorvastatin (CADUET) 5-20 MG per tablet Take 1 tablet by mouth Daily.     • fenofibrate (TRICOR) 48 MG tablet Take 48 mg by mouth Daily.     • levothyroxine (SYNTHROID, LEVOTHROID) 300 MCG tablet Take 300 mcg by mouth Daily.     • metFORMIN (GLUCOPHAGE) 1000 MG tablet Take 1,000 mg by mouth Daily.     • HYDROcodone-acetaminophen (NORCO) 5-325 MG per tablet Take 1 tablet by mouth Every 12 (Twelve) Hours As Needed for Mild Pain . 40 tablet 0     No current facility-administered medications on file prior to encounter.        Past Medical History:   Diagnosis Date   • Cancer (CMS/HCC)     THYROID. COMPLETED THYROIDECTOMY WITH RADIATION   • Chronic low back pain    • Diabetes mellitus (CMS/HCC)     TYPE 2   • Disease of thyroid gland    • History of sleep apnea     WEIGHT LOSS   • Hyperlipidemia     WITH HIGH TRYGLCERIDES   • Hypertension    • Hypothyroidism        No hematologic infectious or constitutional symptoms  Negative screen for RUDDY      Exam:  /100 (BP Location: Left arm, Patient Position: Sitting)   Pulse 70   Resp 16   SpO2 95%   Airway Mallampatti 2  Alert and oriented      Diagnosis:  Post-Op Diagnosis Codes:     * Lumbar stenosis with neurogenic claudication [M48.062]     * History of lumbar surgery [Z98.890]     * Lumbar neuritis [M54.16]     * Foot drop (acquired) [M21.37]    Plan:  Lumbar epidural steroid injection under fluoroscopic guidance    I have encouraged them to continue:  1.  Physical therapy exercises at home as prescribed by physical therapy or from the pain clinic handout (given to the patient).  Continuation of these  exercises every day, or multiple times per week, even when the patient has good pain relief, was stressed to the patient as a preventative measure to decrease the frequency and severity of future pain episodes.  2.  Continue pain medicines as already prescribed.  If patient not currently taking any, it is recommended to begin Acetaminophen 1000 mg po q 8 hours.  If other medicines containing Acetaminophen are currently prescribed, maintain daily dose at 3000mg.    3.  If they can tolerate NSAIDS, it is recommended to take Ibuprofen 600 mg po q 6 hours for 7 days during pain exacerbations.   Alternatively, they may substitute an NSAID of their choice (e.g. Aleve)  4.  Heat and ice to the affected area as tolerated for pain control.  It was discussed that heating pads can cause burns.  5.  Low impact exercise such as walking or water exercise was recommended to maintain overall health and aid in weight control.   6.  Follow up as needed for subsequent injections.  7.  Patient was counseled to abstain from tobacco products.

## 2020-10-14 NOTE — ANESTHESIA PROCEDURE NOTES
PAIN Epidural block    Pre-sedation assessment completed: 10/14/2020 2:41 PM    Patient reassessed immediately prior to procedure    Patient location during procedure: pain clinic  Start Time: 10/14/2020 2:44 PM  Stop Time: 10/14/2020 2:51 PM  Indication:procedure for pain  Performed By  Anesthesiologist: Dieter Naik MD  Preanesthetic Checklist  Completed: patient identified, surgical consent, pre-op evaluation, timeout performed, IV checked, risks and benefits discussed and monitors and equipment checked  Additional Notes  Diagnosis:  Post-Op Diagnosis Codes:     * Lumbar stenosis with neurogenic claudication (M48.062)     * History of lumbar surgery (Z98.890)     * Lumbar neuritis (M54.16)     * Foot drop (acquired) (M21.37)      Prep:  Pt Position:prone  Sterile Tech:gloves, mask and sterile barrier  Prep:chlorhexidine gluconate and isopropyl alcohol  Monitoring:blood pressure monitoring, continuous pulse oximetry and EKG  Procedure:Sedation: yes     Approach:right paramedian  Guidance: fluoroscopy  Location:lumbar  Level:2-3  Needle Type:Tuohy  Needle Gauge:20  Aspiration:negative  Test Dose:negative  Medications:  Depomedrol:80 mg  Preservative Free Saline:3mL  Isovue:2mL    Post Assessment:  Dressing:occlusive dressing applied  Pt Tolerance:patient tolerated the procedure well with no apparent complications  Complications:no

## 2020-12-03 ENCOUNTER — HOSPITAL ENCOUNTER (OUTPATIENT)
Dept: PAIN MEDICINE | Facility: HOSPITAL | Age: 65
Discharge: HOME | End: 2020-12-03

## 2020-12-03 ENCOUNTER — ANESTHESIA (OUTPATIENT)
Dept: PAIN MEDICINE | Facility: HOSPITAL | Age: 65
End: 2020-12-03

## 2020-12-03 ENCOUNTER — HOSPITAL ENCOUNTER (OUTPATIENT)
Dept: GENERAL RADIOLOGY | Facility: HOSPITAL | Age: 65
Discharge: HOME | End: 2020-12-03

## 2020-12-03 ENCOUNTER — ANESTHESIA EVENT (OUTPATIENT)
Dept: PAIN MEDICINE | Facility: HOSPITAL | Age: 65
End: 2020-12-03

## 2020-12-03 VITALS
OXYGEN SATURATION: 94 % | DIASTOLIC BLOOD PRESSURE: 85 MMHG | SYSTOLIC BLOOD PRESSURE: 128 MMHG | TEMPERATURE: 97.5 F | HEART RATE: 65 BPM | RESPIRATION RATE: 14 BRPM

## 2020-12-03 DIAGNOSIS — R52 PAIN: ICD-10-CM

## 2020-12-03 DIAGNOSIS — M48.062 SPINAL STENOSIS OF LUMBAR REGION WITH NEUROGENIC CLAUDICATION: ICD-10-CM

## 2020-12-03 LAB — GLUCOSE BLDC GLUCOMTR-MCNC: 144 MG/DL (ref 70–130)

## 2020-12-03 PROCEDURE — 82962 GLUCOSE BLOOD TEST: CPT

## 2020-12-03 PROCEDURE — 0 IOPAMIDOL 41 % SOLUTION: Performed by: ANESTHESIOLOGY

## 2020-12-03 PROCEDURE — 25010000002 METHYLPREDNISOLONE PER 80 MG: Performed by: ANESTHESIOLOGY

## 2020-12-03 PROCEDURE — 25010000002 FENTANYL CITRATE (PF) 100 MCG/2ML SOLUTION: Performed by: ANESTHESIOLOGY

## 2020-12-03 PROCEDURE — 77003 FLUOROGUIDE FOR SPINE INJECT: CPT

## 2020-12-03 PROCEDURE — C1755 CATHETER, INTRASPINAL: HCPCS

## 2020-12-03 PROCEDURE — 25010000002 MIDAZOLAM PER 1 MG: Performed by: ANESTHESIOLOGY

## 2020-12-03 RX ORDER — SODIUM CHLORIDE 0.9 % (FLUSH) 0.9 %
3-10 SYRINGE (ML) INJECTION AS NEEDED
Status: DISCONTINUED | OUTPATIENT
Start: 2020-12-03 | End: 2020-12-04 | Stop reason: HOSPADM

## 2020-12-03 RX ORDER — SODIUM CHLORIDE 0.9 % (FLUSH) 0.9 %
3 SYRINGE (ML) INJECTION EVERY 12 HOURS SCHEDULED
Status: DISCONTINUED | OUTPATIENT
Start: 2020-12-03 | End: 2020-12-04 | Stop reason: HOSPADM

## 2020-12-03 RX ORDER — SODIUM CHLORIDE 0.9 % (FLUSH) 0.9 %
1-10 SYRINGE (ML) INJECTION AS NEEDED
Status: DISCONTINUED | OUTPATIENT
Start: 2020-12-03 | End: 2020-12-04 | Stop reason: HOSPADM

## 2020-12-03 RX ORDER — FENTANYL CITRATE 50 UG/ML
50 INJECTION, SOLUTION INTRAMUSCULAR; INTRAVENOUS AS NEEDED
Status: DISCONTINUED | OUTPATIENT
Start: 2020-12-03 | End: 2020-12-04 | Stop reason: HOSPADM

## 2020-12-03 RX ORDER — MIDAZOLAM HYDROCHLORIDE 1 MG/ML
1 INJECTION INTRAMUSCULAR; INTRAVENOUS AS NEEDED
Status: DISCONTINUED | OUTPATIENT
Start: 2020-12-03 | End: 2020-12-04 | Stop reason: HOSPADM

## 2020-12-03 RX ORDER — LIDOCAINE HYDROCHLORIDE 10 MG/ML
1 INJECTION, SOLUTION INFILTRATION; PERINEURAL ONCE AS NEEDED
Status: DISCONTINUED | OUTPATIENT
Start: 2020-12-03 | End: 2020-12-04 | Stop reason: HOSPADM

## 2020-12-03 RX ORDER — METHYLPREDNISOLONE ACETATE 80 MG/ML
80 INJECTION, SUSPENSION INTRA-ARTICULAR; INTRALESIONAL; INTRAMUSCULAR; SOFT TISSUE ONCE
Status: COMPLETED | OUTPATIENT
Start: 2020-12-03 | End: 2020-12-03

## 2020-12-03 RX ADMIN — IOPAMIDOL 10 ML: 408 INJECTION, SOLUTION INTRATHECAL at 10:04

## 2020-12-03 RX ADMIN — METHYLPREDNISOLONE ACETATE 80 MG: 80 INJECTION, SUSPENSION INTRA-ARTICULAR; INTRALESIONAL; INTRAMUSCULAR; SOFT TISSUE at 10:04

## 2020-12-03 RX ADMIN — FENTANYL CITRATE 100 MCG: 50 INJECTION, SOLUTION INTRAMUSCULAR; INTRAVENOUS at 10:02

## 2020-12-03 RX ADMIN — MIDAZOLAM 2 MG: 1 INJECTION INTRAMUSCULAR; INTRAVENOUS at 10:01

## 2020-12-03 NOTE — ANESTHESIA PROCEDURE NOTES
PAIN Epidural block    Pre-sedation assessment completed: 12/3/2020 9:58 AM    Patient reassessed immediately prior to procedure    Patient location during procedure: pain clinic  Start Time: 12/3/2020 9:58 AM  Stop Time: 12/3/2020 10:05 AM  Indication:procedure for pain  Performed By  Anesthesiologist: Celeste Braun MD  Preanesthetic Checklist  Completed: patient identified, site marked, surgical consent, pre-op evaluation, timeout performed, IV checked, risks and benefits discussed and monitors and equipment checked  Additional Notes  Fluoro used.    Lumbar spinal stenosis.    Prep:  Pt Position:prone  Sterile Tech:cap, gloves, mask and sterile barrier  Prep:chlorhexidine gluconate and isopropyl alcohol  Monitoring:blood pressure monitoring, continuous pulse oximetry and EKG  Procedure:Sedation: yes     Approach:right paramedian  Guidance: fluoroscopy  Location:lumbar  Level:L5-S1  Needle Type:Tuohy  Needle Gauge:20  Aspiration:negative  Medications:  Depomedrol:80  Preservative Free Saline:3mL  Comments:B/l spread.    Post Assessment:  Dressing:occlusive dressing applied  Pt Tolerance:patient tolerated the procedure well with no apparent complications  Complications:no

## 2020-12-03 NOTE — H&P
[facility]    History and Physical    Patient Name: EVARISTO Garcia  :  1955  MRN:  [id number]  Date of Admission: 12/3/2020    Subjective     Patient is a 65 y.o. male presents with chief complaint of chronic, moderate low back pain.  Onset of symptoms was gradual starting several years ago.  Symptoms are associated/aggravated by nothing in particular. Symptoms improve with injection. Did not get much relief w/ last epidural.  Reports most of his pain at this time is in how lower right extremity.  Discussed w/ him that we've been unable to access L3/4.  Pt would like to try entry at lower epidural space to see if provides better relief.  He's also interested in ablation.  Will have him setup for appt to discuss further w/ Dr. Rosado if he so chooses.  Presents for RENETTA.      The following portions of the patients history were reviewed and updated as appropriate: current medications, allergies, past medical history, past surgical history, past family history, past social history and problem list                Objective     Past Medical History:   Past Medical History:   Diagnosis Date   • Cancer (CMS/HCC)     THYROID. COMPLETED THYROIDECTOMY WITH RADIATION   • Chronic low back pain    • Diabetes mellitus (CMS/HCC)     TYPE 2   • Disease of thyroid gland    • History of sleep apnea     WEIGHT LOSS   • Hyperlipidemia     WITH HIGH TRYGLCERIDES   • Hypertension    • Hypothyroidism      Past Surgical History:   Past Surgical History:   Procedure Laterality Date   • BACK SURGERY      Double anterior L3, L4, L5   • BACK SURGERY     • COLONOSCOPY     • COLONOSCOPY N/A 2019    Procedure: COLONOSCOPY TO CECUM WITH POLYPECTOMY ( COLD BX/HOT SNARE);  Surgeon: Alf Gonzalez Jr., MD;  Location: [facility] ENDOSCOPY;  Service: General   • EPIDURAL BLOCK     • EYE SURGERY     • LAMINECTOMY  1978     L4-L5, L3-L4   • LASIK     • NECK SURGERY     • THYROIDECTOMY  2015    COMPLETE   • TONSILLECTOMY      • VASECTOMY       Family History:   Family History   Problem Relation Age of Onset   • Malig Hyperthermia Neg Hx      Social History:   Social History     Tobacco Use   • Smoking status: Never Smoker   • Smokeless tobacco: Never Used   Substance Use Topics   • Alcohol use: Yes     Comment: ON OCC   • Drug use: No       Vital Signs Range for the last 24 hours  Temperature:     Temp Source:     BP:     Pulse:     Respirations:     SPO2:     O2 Amount (l/min):     O2 Devices     Weight:           --------------------------------------------------------------------------------    Current Outpatient Medications   Medication Sig Dispense Refill   • amLODIPine-atorvastatin (CADUET) 5-20 MG per tablet Take 1 tablet by mouth Daily.     • fenofibrate (TRICOR) 48 MG tablet Take 48 mg by mouth Daily.     • HYDROcodone-acetaminophen (NORCO) 5-325 MG per tablet Take 1 tablet by mouth Every 12 (Twelve) Hours As Needed for Mild Pain . 40 tablet 0   • levothyroxine (SYNTHROID, LEVOTHROID) 300 MCG tablet Take 300 mcg by mouth Daily.     • metFORMIN (GLUCOPHAGE) 1000 MG tablet Take 1,000 mg by mouth Daily.       No current facility-administered medications for this encounter.        --------------------------------------------------------------------------------  Assessment/Plan      Anesthesia Evaluation     Patient summary reviewed and Nursing notes reviewed                Airway   Mallampati: II  Dental - normal exam     Pulmonary - normal exam   (+) sleep apnea,   Cardiovascular - normal exam  Exercise tolerance: good (4-7 METS)    Rhythm: regular    (+) hypertension, hyperlipidemia,       Neuro/Psych- negative ROS and neuro exam normal  GI/Hepatic/Renal/Endo    (+)   diabetes mellitus, thyroid problem hypothyroidism    Musculoskeletal (-) normal exam    (+) back pain, chronic pain,   Abdominal  - normal exam   Substance History - negative use     OB/GYN negative ob/gyn ROS         Other      history of cancer                Diagnosis and Plan    Treatment Plan  ASA 3      Procedures: Lumbar Epidural Steroid Injection(LESI), With fluoroscopy,       Anesthetic plan and risks discussed with patient.          Diagnosis     * Lumbar spinal stenosis [M48.061]

## 2021-03-19 ENCOUNTER — BULK ORDERING (OUTPATIENT)
Dept: CASE MANAGEMENT | Facility: OTHER | Age: 66
End: 2021-03-19

## 2021-03-19 DIAGNOSIS — Z23 IMMUNIZATION DUE: ICD-10-CM

## 2021-05-28 ENCOUNTER — TELEPHONE (OUTPATIENT)
Dept: ORTHOPEDIC SURGERY | Facility: CLINIC | Age: 66
End: 2021-05-28

## 2021-05-28 DIAGNOSIS — M48.062 SPINAL STENOSIS OF LUMBAR REGION WITH NEUROGENIC CLAUDICATION: Primary | ICD-10-CM

## 2021-05-28 RX ORDER — METHYLPREDNISOLONE 4 MG/1
TABLET ORAL
Qty: 21 TABLET | Refills: 0 | Status: SHIPPED | OUTPATIENT
Start: 2021-05-28 | End: 2021-09-21

## 2021-05-28 NOTE — TELEPHONE ENCOUNTER
No can do.  I am out of the office for the next 2 weeks.  If he is getting worse put a message for me to call him but it is going to be a [facility] and here when I return.  He can have epidurals and/or oral steroids meantime.  Show to Sofía if there is any question.  If he deteriorates neurologically then get him into see Dr. Jackson.

## 2021-05-28 NOTE — TELEPHONE ENCOUNTER
C/O upper back right side pain and not able to sleep. Patient states he can try the steroids and the epidurals will help his lower back

## 2021-05-28 NOTE — TELEPHONE ENCOUNTER
Call returned to the patient.  He is having pain in his low back which is essentially unchanged.  He does get good relief with epidurals and is requesting an order to have a repeat epidural.  He also is complaining of some right shoulder pain.  He has difficulty with sleeping on that arm at night.  Denies any numbness or tingling and has good motion and sensation to his hands and fingers.  He describes severe pain anytime he raises his arm above shoulder level or tries to reach behind his back.  We discussed that this may not be coming from his neck that could possibly be coming from his shoulder.  Will go ahead and give him a Medrol Dosepak to see if that helps with the pain but have asked him to contact me at the office if his symptoms do not improve per MOSHE HOLT

## 2021-05-28 NOTE — TELEPHONE ENCOUNTER
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     MAY CALL ANYTIME- MAY LEAVE A MESSAGE  "

## 2021-06-07 NOTE — TELEPHONE ENCOUNTER
..What percentage of improvement did you have in the first 2 to 3 weeks following you last procedure?  50% improvement    Have you tried any other treatments since your last treatment? (Chiropractor, physical therapy, massage, yoga, back exercises)  Back exercises    Has your function improved?  (Able to stand longer, walk further)  Able to stand longer and walk further for 2-3 days    What medications are you currently taking to help with your pain?  Anti inflammatory by prescription as helped    Remember you must be NPO after midnight the night before your appointment.    You have to be off any blood thinning medications for 1 week prior to the procedure (includes prescription, ASA, fish oil, Vitamin E).    If you become ill/develop a fever please call so we can reschedule your appointment.    Patients receiving sedation must have a  who remains in Pain Management during the appointment.   37

## 2021-09-21 ENCOUNTER — OFFICE VISIT (OUTPATIENT)
Dept: ORTHOPEDIC SURGERY | Facility: CLINIC | Age: 66
End: 2021-09-21

## 2021-09-21 VITALS — TEMPERATURE: 96.8 F | WEIGHT: 210 LBS | HEIGHT: 71 IN | BODY MASS INDEX: 29.4 KG/M2

## 2021-09-21 DIAGNOSIS — M48.062 SPINAL STENOSIS OF LUMBAR REGION WITH NEUROGENIC CLAUDICATION: Primary | ICD-10-CM

## 2021-09-21 DIAGNOSIS — M54.2 NECK PAIN: Primary | ICD-10-CM

## 2021-09-21 DIAGNOSIS — M54.2 NECK PAIN: ICD-10-CM

## 2021-09-21 DIAGNOSIS — M47.22 CERVICAL SPONDYLOSIS WITH RADICULOPATHY: ICD-10-CM

## 2021-09-21 PROCEDURE — 72040 X-RAY EXAM NECK SPINE 2-3 VW: CPT | Performed by: ORTHOPAEDIC SURGERY

## 2021-09-21 PROCEDURE — 99213 OFFICE O/P EST LOW 20 MIN: CPT | Performed by: ORTHOPAEDIC SURGERY

## 2021-09-21 RX ORDER — ATORVASTATIN CALCIUM 40 MG/1
40 TABLET, FILM COATED ORAL DAILY
COMMUNITY

## 2021-09-21 RX ORDER — AMLODIPINE BESYLATE 5 MG/1
5 TABLET ORAL DAILY
COMMUNITY

## 2021-09-21 RX ORDER — METFORMIN HYDROCHLORIDE 500 MG/1
1500 TABLET, EXTENDED RELEASE ORAL
COMMUNITY
Start: 2021-07-26 | End: 2022-07-26

## 2021-09-21 NOTE — PROGRESS NOTES
New patient or new problem visit    CC: Neck pain right shoulder pain    HPI: He notes neck and right shoulder pain and perceives some weakness in the right shoulder may be even some atrophy.  Been ongoing now for a while.  I followed him more recently for low back pain.  He has a remote C4-5 fusion from which she did well a few years back.    PFSH: See attached    ROS: No balance difficulties bowel or bladder complaints    PE: On exam I do not see any atrophy in the right shoulder or weakness.  Good strength in the upper extremities otherwise.  Reflexes are intact and symmetrical    XRAY: X-rays show solid fusion of 4 5 degenerative changes above and below and overall loss of lordosis.    Other: n/a    Impression: I plan MRI scan of the cervical spine perhaps with an eye toward epidurals or even surgery I will call him with results    Plan: Cervical MRI scan

## 2021-09-22 NOTE — PROGRESS NOTES
New patient or new problem visit    CC: Neck pain right shoulder pain    HPI: Has neck and right shoulder pain.  Ongoing chronically worse recently.  No balance difficulties.  He feels like the right shoulder is weak and may be smaller.  No falling no bowel or bladder complaints.  He has done physical therapy in the past.    PFSH: See attached    ROS: As above    PE: On exam good strength in the upper extremities bilaterally reflexes and sensation appear intact Keaton test negative.    XRAY: Plain film x-rays show a solid fusion at C4-5 with instrumentation, degenerative changes at 3 4 above and 5 6 below.  Not a dramatic change from prior films.  Definite loss of lordosis.    Other: n/a    Impression: Cervical spondylosis with radiculopathy    Plan: At this point this is gone on chronically.  I plan MRI for further evaluation.

## (undated) DEVICE — CANN NASL CO2 TRULINK W/O2 A/

## (undated) DEVICE — SINGLE-USE BIOPSY FORCEPS: Brand: RADIAL JAW 4

## (undated) DEVICE — THE SINGLE USE ETRAP – POLYP TRAP IS USED FOR SUCTION RETRIEVAL OF ENDOSCOPICALLY REMOVED POLYPS.: Brand: ETRAP

## (undated) DEVICE — Device: Brand: DEFENDO AIR/WATER/SUCTION AND BIOPSY VALVE

## (undated) DEVICE — SNAR POLYP SENSATION STDOVL 27 240 BX40

## (undated) DEVICE — THE TORRENT IRRIGATION SCOPE CONNECTOR IS USED WITH THE TORRENT IRRIGATION TUBING TO PROVIDE IRRIGATION FLUIDS SUCH AS STERILE WATER DURING GASTROINTESTINAL ENDOSCOPIC PROCEDURES WHEN USED IN CONJUNCTION WITH AN IRRIGATION PUMP (OR ELECTROSURGICAL UNIT).: Brand: TORRENT

## (undated) DEVICE — SENSR O2 OXIMAX FNGR A/ 18IN NONSTR

## (undated) DEVICE — TUBING, SUCTION, 1/4" X 10', STRAIGHT: Brand: MEDLINE